# Patient Record
Sex: MALE | Race: WHITE | NOT HISPANIC OR LATINO | Employment: OTHER | RURAL
[De-identification: names, ages, dates, MRNs, and addresses within clinical notes are randomized per-mention and may not be internally consistent; named-entity substitution may affect disease eponyms.]

---

## 2022-05-03 ENCOUNTER — TELEMEDICINE (OUTPATIENT)
Dept: FAMILY MEDICINE CLINIC | Facility: CLINIC | Age: 67
End: 2022-05-03

## 2022-05-03 DIAGNOSIS — R60.9 PERIPHERAL EDEMA: ICD-10-CM

## 2022-05-03 DIAGNOSIS — I10 PRIMARY HYPERTENSION: ICD-10-CM

## 2022-05-03 DIAGNOSIS — Z12.11 SCREENING FOR COLON CANCER: ICD-10-CM

## 2022-05-03 DIAGNOSIS — J30.1 ALLERGIC RHINITIS DUE TO POLLEN, UNSPECIFIED SEASONALITY: Primary | ICD-10-CM

## 2022-05-03 DIAGNOSIS — F41.9 ANXIETY: ICD-10-CM

## 2022-05-03 PROBLEM — R06.09 DYSPNEA ON EXERTION: Status: ACTIVE | Noted: 2022-05-03

## 2022-05-03 PROBLEM — J44.9 CHRONIC OBSTRUCTIVE LUNG DISEASE: Status: ACTIVE | Noted: 2022-05-03

## 2022-05-03 PROBLEM — E66.01 MORBID OBESITY: Status: ACTIVE | Noted: 2022-05-03

## 2022-05-03 PROBLEM — B35.3 TINEA PEDIS: Status: ACTIVE | Noted: 2022-05-03

## 2022-05-03 PROBLEM — R25.2 LEG CRAMPS: Status: ACTIVE | Noted: 2022-05-03

## 2022-05-03 PROBLEM — R60.0 PERIPHERAL EDEMA: Status: ACTIVE | Noted: 2022-05-03

## 2022-05-03 PROBLEM — M25.50 JOINT PAIN: Status: ACTIVE | Noted: 2022-05-03

## 2022-05-03 PROCEDURE — 99214 OFFICE O/P EST MOD 30 MIN: CPT | Performed by: NURSE PRACTITIONER

## 2022-05-03 RX ORDER — MONTELUKAST SODIUM 10 MG/1
10 TABLET ORAL NIGHTLY
COMMUNITY
End: 2022-05-03 | Stop reason: SDUPTHER

## 2022-05-03 RX ORDER — FLUOXETINE HYDROCHLORIDE 20 MG/1
20 CAPSULE ORAL DAILY
Qty: 90 CAPSULE | Refills: 1 | Status: SHIPPED | OUTPATIENT
Start: 2022-05-03 | End: 2022-10-13

## 2022-05-03 RX ORDER — BUMETANIDE 2 MG/1
2 TABLET ORAL DAILY
Qty: 90 TABLET | Refills: 1 | Status: SHIPPED | OUTPATIENT
Start: 2022-05-03 | End: 2022-11-30 | Stop reason: SDUPTHER

## 2022-05-03 RX ORDER — MONTELUKAST SODIUM 10 MG/1
10 TABLET ORAL NIGHTLY
Qty: 90 TABLET | Refills: 3 | Status: SHIPPED | OUTPATIENT
Start: 2022-05-03

## 2022-05-03 RX ORDER — BUMETANIDE 2 MG/1
2 TABLET ORAL DAILY
COMMUNITY
End: 2022-05-03 | Stop reason: SDUPTHER

## 2022-05-03 RX ORDER — POTASSIUM CHLORIDE 1500 MG/1
20 TABLET, FILM COATED, EXTENDED RELEASE ORAL DAILY
Qty: 90 TABLET | Refills: 1 | Status: SHIPPED | OUTPATIENT
Start: 2022-05-03 | End: 2022-11-30 | Stop reason: SDUPTHER

## 2022-05-03 RX ORDER — FLUOXETINE HYDROCHLORIDE 20 MG/1
20 CAPSULE ORAL DAILY
COMMUNITY
End: 2022-05-03 | Stop reason: SDUPTHER

## 2022-05-03 NOTE — PROGRESS NOTES
Chief Complaint  Med Refill  This was an audio and video enabled telemedicine encounter.  Subjective          Fredy Palacios presents to [facility]  Pt is here for refills on his medications. He ran out of meds several months ago. He has had swelling at times and shortness of breath. His anxiety is not controlled.      Objective   Vital Signs:   There were no vitals taken for this visit.    There is no height or weight on file to calculate BMI.    Review of Systems   Constitutional: Negative for fatigue and fever.   Respiratory: Negative for shortness of breath.    Cardiovascular: Negative for chest pain, palpitations and leg swelling.   Neurological: Negative for syncope.   Psychiatric/Behavioral: The patient is not nervous/anxious.         Negative depression          Current Outpatient Medications:   •  bumetanide (BUMEX) 2 MG tablet, Take 1 tablet by mouth Daily., Disp: 90 tablet, Rfl: 1  •  FLUoxetine (PROzac) 20 MG capsule, Take 1 capsule by mouth Daily., Disp: 90 capsule, Rfl: 1  •  montelukast (SINGULAIR) 10 MG tablet, Take 1 tablet by mouth Every Night., Disp: 90 tablet, Rfl: 3  •  potassium chloride ER (K-TAB) 20 MEQ tablet controlled-release ER tablet, Take 1 tablet by mouth Daily., Disp: 90 tablet, Rfl: 1      Allergies: Cephalexin and Penicillins    Physical Exam  Constitutional:       Appearance: Normal appearance.   Neurological:      Mental Status: He is alert.   Psychiatric:         Mood and Affect: Mood normal.         Behavior: Behavior normal.         Thought Content: Thought content normal.         Judgment: Judgment normal.          Result Review :                   Assessment and Plan    Diagnoses and all orders for this visit:    1. Allergic rhinitis due to pollen, unspecified seasonality (Primary)  Comments:  Restart meds. Return for fasting labs in 2 weeks. Cologuard ordered.  Orders:  -     montelukast (SINGULAIR) 10 MG tablet; Take 1 tablet by mouth  Every Night.  Dispense: 90 tablet; Refill: 3    2. Anxiety  Comments:  Restart meds. Return in 2 weeks for follow up and fasting labs.   Orders:  -     FLUoxetine (PROzac) 20 MG capsule; Take 1 capsule by mouth Daily.  Dispense: 90 capsule; Refill: 1    3. Primary hypertension  Comments:  Restart meds. Return in 2 weeks for follow up and fasting labs.  Orders:  -     bumetanide (BUMEX) 2 MG tablet; Take 1 tablet by mouth Daily.  Dispense: 90 tablet; Refill: 1    4. Peripheral edema  Comments:  Restart meds. Return for follow up and fasting labs in 2 weeks.  Orders:  -     bumetanide (BUMEX) 2 MG tablet; Take 1 tablet by mouth Daily.  Dispense: 90 tablet; Refill: 1  -     potassium chloride ER (K-TAB) 20 MEQ tablet controlled-release ER tablet; Take 1 tablet by mouth Daily.  Dispense: 90 tablet; Refill: 1    5. Screening for colon cancer  -     Cologuard - Stool, Per Rectum; Future              BMI has not been calculated during today's encounter.        Follow Up   Return in about 2 weeks (around 5/17/2022) for Medicare Wellness with Provider.  Patient was given instructions and counseling regarding his condition or for health maintenance advice. Please see specific information pulled into the AVS if appropriate.     RADHA Baig

## 2022-06-02 ENCOUNTER — TELEPHONE (OUTPATIENT)
Dept: FAMILY MEDICINE CLINIC | Facility: CLINIC | Age: 67
End: 2022-06-02

## 2022-06-02 DIAGNOSIS — R53.83 FATIGUE, UNSPECIFIED TYPE: ICD-10-CM

## 2022-06-02 DIAGNOSIS — R73.9 HYPERGLYCEMIA: ICD-10-CM

## 2022-06-02 DIAGNOSIS — Z12.5 SCREENING FOR PROSTATE CANCER: ICD-10-CM

## 2022-06-02 DIAGNOSIS — E78.2 MIXED HYPERLIPIDEMIA: Primary | ICD-10-CM

## 2022-06-02 DIAGNOSIS — E55.9 VITAMIN D DEFICIENCY: ICD-10-CM

## 2022-06-02 DIAGNOSIS — E56.9 VITAMIN DEFICIENCY: ICD-10-CM

## 2022-06-02 NOTE — TELEPHONE ENCOUNTER
Patient called the [facility] office to have labs done prior to appointment on 06/13/2022 with Lakeisha. There are no active lab orders in his chart. If this is needed, please order labs for patient and give him a call letting him know he is good to schedule labs with [facility]. Please advise.

## 2022-06-06 ENCOUNTER — LAB (OUTPATIENT)
Dept: FAMILY MEDICINE CLINIC | Facility: CLINIC | Age: 67
End: 2022-06-06

## 2022-06-06 DIAGNOSIS — R53.83 FATIGUE, UNSPECIFIED TYPE: ICD-10-CM

## 2022-06-06 DIAGNOSIS — R73.9 HYPERGLYCEMIA: ICD-10-CM

## 2022-06-06 DIAGNOSIS — E78.2 MIXED HYPERLIPIDEMIA: ICD-10-CM

## 2022-06-06 DIAGNOSIS — Z12.5 SCREENING FOR PROSTATE CANCER: ICD-10-CM

## 2022-06-06 DIAGNOSIS — E55.9 VITAMIN D DEFICIENCY: ICD-10-CM

## 2022-06-06 DIAGNOSIS — E56.9 VITAMIN DEFICIENCY: ICD-10-CM

## 2022-06-06 PROCEDURE — 36415 COLL VENOUS BLD VENIPUNCTURE: CPT | Performed by: NURSE PRACTITIONER

## 2022-06-07 ENCOUNTER — OFFICE VISIT (OUTPATIENT)
Dept: FAMILY MEDICINE CLINIC | Facility: CLINIC | Age: 67
End: 2022-06-07

## 2022-06-07 VITALS
WEIGHT: 315 LBS | HEART RATE: 80 BPM | OXYGEN SATURATION: 98 % | DIASTOLIC BLOOD PRESSURE: 82 MMHG | SYSTOLIC BLOOD PRESSURE: 126 MMHG | BODY MASS INDEX: 44.1 KG/M2 | HEIGHT: 71 IN

## 2022-06-07 DIAGNOSIS — R42 DIZZINESS: ICD-10-CM

## 2022-06-07 DIAGNOSIS — S16.1XXA STRAIN OF STERNOCLEIDOMASTOID MUSCLE, INITIAL ENCOUNTER: Primary | ICD-10-CM

## 2022-06-07 DIAGNOSIS — J30.1 ALLERGIC RHINITIS DUE TO POLLEN, UNSPECIFIED SEASONALITY: ICD-10-CM

## 2022-06-07 LAB
25(OH)D3+25(OH)D2 SERPL-MCNC: 28.4 NG/ML (ref 30–100)
ALBUMIN SERPL-MCNC: 3.8 G/DL (ref 3.8–4.8)
ALBUMIN/GLOB SERPL: 1.6 {RATIO} (ref 1.2–2.2)
ALP SERPL-CCNC: 91 IU/L (ref 44–121)
ALT SERPL-CCNC: 20 IU/L (ref 0–44)
AST SERPL-CCNC: 22 IU/L (ref 0–40)
BASOPHILS # BLD AUTO: 0 X10E3/UL (ref 0–0.2)
BASOPHILS NFR BLD AUTO: 0 %
BILIRUB SERPL-MCNC: 0.5 MG/DL (ref 0–1.2)
BUN SERPL-MCNC: 14 MG/DL (ref 8–27)
BUN/CREAT SERPL: 11 (ref 10–24)
CALCIUM SERPL-MCNC: 8.7 MG/DL (ref 8.6–10.2)
CHLORIDE SERPL-SCNC: 99 MMOL/L (ref 96–106)
CHOLEST SERPL-MCNC: 184 MG/DL (ref 100–199)
CO2 SERPL-SCNC: 26 MMOL/L (ref 20–29)
CREAT SERPL-MCNC: 1.28 MG/DL (ref 0.76–1.27)
EGFRCR SERPLBLD CKD-EPI 2021: 61 ML/MIN/1.73
EOSINOPHIL # BLD AUTO: 0.3 X10E3/UL (ref 0–0.4)
EOSINOPHIL NFR BLD AUTO: 3 %
ERYTHROCYTE [DISTWIDTH] IN BLOOD BY AUTOMATED COUNT: 13.3 % (ref 11.6–15.4)
FOLATE SERPL-MCNC: 8.3 NG/ML
GLOBULIN SER CALC-MCNC: 2.4 G/DL (ref 1.5–4.5)
GLUCOSE SERPL-MCNC: 92 MG/DL (ref 65–99)
HBA1C MFR BLD: 5.8 % (ref 4.8–5.6)
HCT VFR BLD AUTO: 46.5 % (ref 37.5–51)
HDLC SERPL-MCNC: 59 MG/DL
HGB BLD-MCNC: 16.1 G/DL (ref 13–17.7)
IMM GRANULOCYTES # BLD AUTO: 0.1 X10E3/UL (ref 0–0.1)
IMM GRANULOCYTES NFR BLD AUTO: 1 %
LDLC SERPL CALC-MCNC: 106 MG/DL (ref 0–99)
LYMPHOCYTES # BLD AUTO: 3.8 X10E3/UL (ref 0.7–3.1)
LYMPHOCYTES NFR BLD AUTO: 42 %
MCH RBC QN AUTO: 31.6 PG (ref 26.6–33)
MCHC RBC AUTO-ENTMCNC: 34.6 G/DL (ref 31.5–35.7)
MCV RBC AUTO: 91 FL (ref 79–97)
MONOCYTES # BLD AUTO: 0.6 X10E3/UL (ref 0.1–0.9)
MONOCYTES NFR BLD AUTO: 7 %
NEUTROPHILS # BLD AUTO: 4.3 X10E3/UL (ref 1.4–7)
NEUTROPHILS NFR BLD AUTO: 47 %
PLATELET # BLD AUTO: 183 X10E3/UL (ref 150–450)
POTASSIUM SERPL-SCNC: 3.2 MMOL/L (ref 3.5–5.2)
PROT SERPL-MCNC: 6.2 G/DL (ref 6–8.5)
PSA SERPL-MCNC: 5.4 NG/ML (ref 0–4)
RBC # BLD AUTO: 5.09 X10E6/UL (ref 4.14–5.8)
SODIUM SERPL-SCNC: 144 MMOL/L (ref 134–144)
TRIGL SERPL-MCNC: 107 MG/DL (ref 0–149)
TSH SERPL DL<=0.005 MIU/L-ACNC: 3.7 UIU/ML (ref 0.45–4.5)
VIT B12 SERPL-MCNC: 183 PG/ML (ref 232–1245)
VLDLC SERPL CALC-MCNC: 19 MG/DL (ref 5–40)
WBC # BLD AUTO: 9 X10E3/UL (ref 3.4–10.8)

## 2022-06-07 PROCEDURE — 99213 OFFICE O/P EST LOW 20 MIN: CPT | Performed by: PHYSICIAN ASSISTANT

## 2022-06-07 RX ORDER — IPRATROPIUM BROMIDE 21 UG/1
2 SPRAY, METERED NASAL EVERY 12 HOURS
Qty: 1 EACH | Refills: 1 | Status: SHIPPED | OUTPATIENT
Start: 2022-06-07 | End: 2022-06-13

## 2022-06-07 NOTE — PROGRESS NOTES
"Chief Complaint  Right ear pain    Subjective        Fredy Palacios presents to [facility]  History of Present Illness   Patient states that about a week ago he began having a stiff neck especially on the right.  He states that he has been taking 3 tablets of ibuprofen once or twice a day with a bit of help.  He states that last night he put some heat on it which seem to make the neck pain twice his back.  He states that he has had pain running from his right shoulder up the right side of his neck to behind his right ear.  He denies any pain in his shoulder or arm he denies any change in sensation.  He states that he does not have any pain in his ear.   He states that he has had a bit of dizziness on and off for the past week or so.  He states that it reminds him of when he was diagnosed with vertigo and inner ear issues.  He states that his symptoms currently are much less severe. He has had no nausea or vomiting  He states that he has been keeping a runny nose he states that he has clear drainage most of the time.  He states that he was only recently diagnosed with allergies.  Objective   Vital Signs:  /82 (BP Location: Left arm, Patient Position: Sitting, Cuff Size: Large Adult)   Pulse 80   Ht 180.3 cm (71\")   Wt (!) 150 kg (330 lb)   SpO2 98%   BMI 46.03 kg/m²   Estimated body mass index is 46.03 kg/m² as calculated from the following:    Height as of this encounter: 180.3 cm (71\").    Weight as of this encounter: 150 kg (330 lb).           Physical Exam  Vitals reviewed.   Constitutional:       Appearance: Normal appearance.   HENT:      Head: Normocephalic.      Right Ear: Tympanic membrane, ear canal and external ear normal. There is impacted cerumen.      Left Ear: Tympanic membrane, ear canal and external ear normal.      Ears:      Comments: Impacted cerumen removed and TM only partially obscured     Nose: Nose normal.      Mouth/Throat:      Mouth: Mucous " membranes are moist.      Comments: Clear post nasal drainage  Cardiovascular:      Rate and Rhythm: Normal rate and regular rhythm.      Heart sounds: Normal heart sounds.   Pulmonary:      Effort: Pulmonary effort is normal.      Breath sounds: Normal breath sounds.   Musculoskeletal:        Arms:    Neurological:      General: No focal deficit present.      Mental Status: He is alert.   Psychiatric:         Mood and Affect: Mood normal.        Result Review :                Assessment and Plan   Diagnoses and all orders for this visit:    1. Strain of sternocleidomastoid muscle, initial encounter (Primary)  We will have patient continue his ibuprofen and encouraged ice to the area as well as gentle stretches.  2. Dizziness  We will add Atrovent nasal spray to help decrease runny nose and postnasal drainage which should also decrease any fluid behind his ears and his dizziness  -     ipratropium (ATROVENT) 0.03 % nasal spray; 2 sprays into the nostril(s) as directed by provider Every 12 (Twelve) Hours.  Dispense: 1 each; Refill: 1    3. Allergic rhinitis due to pollen, unspecified seasonality  See treatment as above.    -     ipratropium (ATROVENT) 0.03 % nasal spray; 2 sprays into the nostril(s) as directed by provider Every 12 (Twelve) Hours.  Dispense: 1 each; Refill: 1    He is to follow-up with his primary care in a few days.       Follow Up   Return today (on 6/7/2022), or folow with crystal on monday.  Patient was given instructions and counseling regarding his condition or for health maintenance advice. Please see specific information pulled into the AVS if appropriate.       Answers for HPI/ROS submitted by the patient on 6/7/2022  What is the primary reason for your visit?: Ear Pain       Yes

## 2022-06-08 ENCOUNTER — TELEPHONE (OUTPATIENT)
Dept: FAMILY MEDICINE CLINIC | Facility: CLINIC | Age: 67
End: 2022-06-08

## 2022-06-08 NOTE — TELEPHONE ENCOUNTER
PATIENT CALLED, HE WAS SEEN ON 6/7/22 FOR NECK PAIN AND IT HAS NOT GOTTEN ANY BETTER. HE HAS AN APPOINTMENT ON 6/13/22.

## 2022-06-08 NOTE — PROGRESS NOTES
Will you let him know that his PSA is abnormal so I'd like him to see a urologist. His vitamin D was low so take 2,000 units of vitamin D daily. His B12 was low so he can either come to office for monthly injections or try taking 4,000 units of B12 daily. His lymphocytes were a bit elevated which may mean his body was fighting off a virus.  Thanks!

## 2022-06-09 ENCOUNTER — PATIENT MESSAGE (OUTPATIENT)
Dept: INTERNAL MEDICINE | Facility: CLINIC | Age: 67
End: 2022-06-09

## 2022-06-09 DIAGNOSIS — R97.20 PSA ELEVATION: Primary | ICD-10-CM

## 2022-06-10 NOTE — TELEPHONE ENCOUNTER
Looks like a float sent it to crystal, although she was not the provider that saw this patient. Lakeisha is on vacation, I forwarded it on to eric rudolph.

## 2022-06-10 NOTE — TELEPHONE ENCOUNTER
Im the cover practitioner and not in the office today--please send to a practitioner who is in office todaythanks

## 2022-06-13 ENCOUNTER — OFFICE VISIT (OUTPATIENT)
Dept: FAMILY MEDICINE CLINIC | Facility: CLINIC | Age: 67
End: 2022-06-13

## 2022-06-13 VITALS
HEART RATE: 76 BPM | OXYGEN SATURATION: 96 % | BODY MASS INDEX: 44.1 KG/M2 | SYSTOLIC BLOOD PRESSURE: 130 MMHG | HEIGHT: 71 IN | DIASTOLIC BLOOD PRESSURE: 90 MMHG | WEIGHT: 315 LBS

## 2022-06-13 DIAGNOSIS — I10 BENIGN HYPERTENSION: ICD-10-CM

## 2022-06-13 DIAGNOSIS — E53.8 B12 DEFICIENCY: ICD-10-CM

## 2022-06-13 DIAGNOSIS — L03.116 CELLULITIS OF LEFT LOWER EXTREMITY: ICD-10-CM

## 2022-06-13 DIAGNOSIS — Z00.00 ENCOUNTER FOR ANNUAL WELLNESS EXAM IN MEDICARE PATIENT: Primary | ICD-10-CM

## 2022-06-13 DIAGNOSIS — R60.9 PERIPHERAL EDEMA: ICD-10-CM

## 2022-06-13 DIAGNOSIS — J30.1 ALLERGIC RHINITIS DUE TO POLLEN, UNSPECIFIED SEASONALITY: ICD-10-CM

## 2022-06-13 DIAGNOSIS — Z00.00 ROUTINE MEDICAL EXAM: ICD-10-CM

## 2022-06-13 DIAGNOSIS — M54.2 NECK PAIN: ICD-10-CM

## 2022-06-13 DIAGNOSIS — F41.9 ANXIETY: ICD-10-CM

## 2022-06-13 PROBLEM — F33.0 MILD RECURRENT MAJOR DEPRESSION: Status: ACTIVE | Noted: 2022-06-13

## 2022-06-13 PROBLEM — Z72.0 TOBACCO USER: Status: ACTIVE | Noted: 2022-06-13

## 2022-06-13 PROBLEM — F39 AFFECTIVE PSYCHOSIS: Status: ACTIVE | Noted: 2022-06-13

## 2022-06-13 PROCEDURE — 99397 PER PM REEVAL EST PAT 65+ YR: CPT | Performed by: NURSE PRACTITIONER

## 2022-06-13 PROCEDURE — 96372 THER/PROPH/DIAG INJ SC/IM: CPT | Performed by: NURSE PRACTITIONER

## 2022-06-13 PROCEDURE — 96160 PT-FOCUSED HLTH RISK ASSMT: CPT | Performed by: NURSE PRACTITIONER

## 2022-06-13 PROCEDURE — 1159F MED LIST DOCD IN RCRD: CPT | Performed by: NURSE PRACTITIONER

## 2022-06-13 PROCEDURE — G0439 PPPS, SUBSEQ VISIT: HCPCS | Performed by: NURSE PRACTITIONER

## 2022-06-13 PROCEDURE — 1170F FXNL STATUS ASSESSED: CPT | Performed by: NURSE PRACTITIONER

## 2022-06-13 RX ORDER — TIZANIDINE 4 MG/1
4 TABLET ORAL EVERY 8 HOURS PRN
Qty: 30 TABLET | Refills: 0 | Status: SHIPPED | OUTPATIENT
Start: 2022-06-13 | End: 2022-06-23

## 2022-06-13 RX ORDER — SULFAMETHOXAZOLE AND TRIMETHOPRIM 800; 160 MG/1; MG/1
1 TABLET ORAL 2 TIMES DAILY
Qty: 20 TABLET | Refills: 0 | Status: SHIPPED | OUTPATIENT
Start: 2022-06-13 | End: 2022-06-18

## 2022-06-13 RX ORDER — CYANOCOBALAMIN 1000 UG/ML
1000 INJECTION, SOLUTION INTRAMUSCULAR; SUBCUTANEOUS
Status: DISCONTINUED | OUTPATIENT
Start: 2022-06-13 | End: 2023-02-01

## 2022-06-13 RX ORDER — HYDROCODONE BITARTRATE AND ACETAMINOPHEN 7.5; 325 MG/1; MG/1
1 TABLET ORAL EVERY 6 HOURS PRN
Qty: 12 TABLET | Refills: 0 | Status: SHIPPED | OUTPATIENT
Start: 2022-06-13 | End: 2022-06-18

## 2022-06-13 RX ORDER — TRIAMCINOLONE ACETONIDE 40 MG/ML
60 INJECTION, SUSPENSION INTRA-ARTICULAR; INTRAMUSCULAR ONCE
Status: COMPLETED | OUTPATIENT
Start: 2022-06-13 | End: 2022-06-13

## 2022-06-13 RX ORDER — POTASSIUM CHLORIDE 20 MEQ/1
TABLET, EXTENDED RELEASE ORAL
COMMUNITY
Start: 2022-05-03 | End: 2022-06-13 | Stop reason: SDUPTHER

## 2022-06-13 RX ADMIN — CYANOCOBALAMIN 1000 MCG: 1000 INJECTION, SOLUTION INTRAMUSCULAR; SUBCUTANEOUS at 15:47

## 2022-06-13 RX ADMIN — TRIAMCINOLONE ACETONIDE 60 MG: 40 INJECTION, SUSPENSION INTRA-ARTICULAR; INTRAMUSCULAR at 15:48

## 2022-06-13 NOTE — PROGRESS NOTES
The ABCs of the Annual Wellness Visit  Subsequent Medicare Wellness Visit    Chief Complaint   Patient presents with   • Annual Exam      Subjective    History of Present Illness:  Fredy Palacios is a 66 y.o. male who presents for a Subsequent Medicare Wellness Visit.  He has had right sided neck pain x 10 days with no known injury. He went to the ER last night and was given Robaxin. He has had increased swelling in his legs since yesterday with redness.   The following portions of the patient's history were reviewed and   updated as appropriate: allergies, past family history, past social history and past surgical history.    Compared to one year ago, the patient feels his physical   health is the same.    Compared to one year ago, the patient feels his mental   health is worse.    Recent Hospitalizations:  He was not admitted to the hospital during the last year.       Current Medical Providers:  Patient Care Team:  Lakeisha Lynne APRN as PCP - General (Nurse Practitioner)    Outpatient Medications Prior to Visit   Medication Sig Dispense Refill   • bumetanide (BUMEX) 2 MG tablet Take 1 tablet by mouth Daily. 90 tablet 1   • FLUoxetine (PROzac) 20 MG capsule Take 1 capsule by mouth Daily. 90 capsule 1   • montelukast (SINGULAIR) 10 MG tablet Take 1 tablet by mouth Every Night. 90 tablet 3   • potassium chloride ER (K-TAB) 20 MEQ tablet controlled-release ER tablet Take 1 tablet by mouth Daily. 90 tablet 1   • ipratropium (ATROVENT) 0.03 % nasal spray 2 sprays into the nostril(s) as directed by provider Every 12 (Twelve) Hours. 1 each 1   • potassium chloride (K-DUR,KLOR-CON) 20 MEQ CR tablet        No facility-administered medications prior to visit.       Opioid medication/s are on active medication list.  and I have evaluated his active treatment plan and pain score trends (see table).  There were no vitals filed for this visit.  I have reviewed the chart for potential of high risk medication and  "harmful drug interactions in the elderly.            Aspirin is not on active medication list.  Aspirin use is not indicated based on review of current medical condition/s. Risk of harm outweighs potential benefits.  .    Patient Active Problem List   Diagnosis   • Anxiety   • Benign hypertension   • Chronic obstructive lung disease (HCC)   • Dyspnea on exertion   • Joint pain   • Leg cramps   • Morbid obesity (HCC)   • Peripheral edema   • Tinea pedis   • Affective psychosis (HCC)   • Body mass index (BMI) 40.0-44.9, adult (HCC)   • Mild recurrent major depression (HCC)   • Tobacco user     Advance Care Planning  Advance Directive is not on file.  ACP discussion was held with the patient during this visit. Patient does not have an advance directive, information provided.    Review of Systems   Constitutional: Negative for fatigue and fever.   Respiratory: Negative for shortness of breath.    Cardiovascular: Positive for leg swelling. Negative for chest pain and palpitations.   Musculoskeletal: Positive for neck pain and neck stiffness.   Neurological: Negative for syncope.   Psychiatric/Behavioral: The patient is not nervous/anxious.         Objective    Vitals:    06/13/22 1443   BP: 130/90   Pulse: 76   SpO2: 96%   Weight: (!) 155 kg (342 lb)   Height: 180.3 cm (71\")     Estimated body mass index is 47.7 kg/m² as calculated from the following:    Height as of this encounter: 180.3 cm (71\").    Weight as of this encounter: 155 kg (342 lb).    Class 3 Severe Obesity (BMI >=40). Obesity-related health conditions include the following: hypertension. Obesity is unchanged. BMI is is above average; BMI management plan is completed. We discussed low calorie, low carb based diet program and portion control.      Does the patient have evidence of cognitive impairment? No    Physical Exam  Constitutional:       Appearance: Normal appearance.   HENT:      Head: Normocephalic.   Eyes:      Conjunctiva/sclera: Conjunctivae " normal.      Pupils: Pupils are equal, round, and reactive to light.   Cardiovascular:      Rate and Rhythm: Normal rate and regular rhythm.      Heart sounds: Normal heart sounds.      Comments: Left lower leg erythematous  Pulmonary:      Effort: Pulmonary effort is normal.      Breath sounds: Normal breath sounds.   Abdominal:      Tenderness: There is no abdominal tenderness.   Musculoskeletal:         General: Normal range of motion.      Right lower le+ Edema present.      Left lower le+ Edema present.   Skin:     General: Skin is warm and dry.      Capillary Refill: Capillary refill takes less than 2 seconds.   Neurological:      General: No focal deficit present.      Mental Status: He is alert and oriented to person, place, and time.   Psychiatric:         Mood and Affect: Mood normal.         Behavior: Behavior normal.         Thought Content: Thought content normal.         Judgment: Judgment normal.       Lab Results   Component Value Date    CHLPL 184 2022    TRIG 107 2022    HDL 59 2022     (H) 2022    VLDL 19 2022    HGBA1C 5.8 (H) 2022            HEALTH RISK ASSESSMENT    Smoking Status:  Social History     Tobacco Use   Smoking Status Light Tobacco Smoker   • Types: Cigars   Smokeless Tobacco Current User   • Types: Chew     Alcohol Consumption:  Social History     Substance and Sexual Activity   Alcohol Use Not Currently     Fall Risk Screen:    STEADI Fall Risk Assessment was completed, and patient is at LOW risk for falls.Assessment completed on:5/3/2022    Depression Screening:  PHQ-2/PHQ-9 Depression Screening 2022   Little Interest or Pleasure in Doing Things 0-->not at all   Feeling Down, Depressed or Hopeless 0-->not at all   PHQ-9: Brief Depression Severity Measure Score 0       Health Habits and Functional and Cognitive Screening:  Functional & Cognitive Status 2022   Do you have difficulty preparing food and eating? No   Do you  have difficulty bathing yourself, getting dressed or grooming yourself? No   Do you have difficulty using the toilet? No   Do you have difficulty moving around from place to place? No   Do you have trouble with steps or getting out of a bed or a chair? No   Current Diet Unhealthy Diet   Dental Exam Not up to date   Eye Exam Not up to date   Exercise (times per week) 0 times per week   Current Exercises Include No Regular Exercise   Do you need help using the phone?  No   Are you deaf or do you have serious difficulty hearing?  No   Do you need help with transportation? No   Do you need help shopping? No   Do you need help preparing meals?  No   Do you need help with housework?  No   Do you need help with laundry? No   Do you need help taking your medications? No   Do you need help managing money? No   Do you ever drive or ride in a car without wearing a seat belt? No   Have you felt unusual stress, anger or loneliness in the last month? Yes   Who do you live with? Spouse   If you need help, do you have trouble finding someone available to you? No   Have you been bothered in the last four weeks by sexual problems? No   Do you have difficulty concentrating, remembering or making decisions? No       Age-appropriate Screening Schedule:  Refer to the list below for future screening recommendations based on patient's age, sex and/or medical conditions. Orders for these recommended tests are listed in the plan section. The patient has been provided with a written plan.    Health Maintenance   Topic Date Due   • ZOSTER VACCINE (2 of 3) 06/16/2023 (Originally 11/14/2017)   • INFLUENZA VACCINE  08/01/2022   • TDAP/TD VACCINES (2 - Td or Tdap) 11/30/2022   • LIPID PANEL  06/06/2023              Assessment & Plan   CMS Preventative Services Quick Reference  Risk Factors Identified During Encounter  Inactivity/Sedentary  Obesity/Overweight   The above risks/problems have been discussed with the patient.  Follow up actions/plans  if indicated are seen below in the Assessment/Plan Section.  Pertinent information has been shared with the patient in the After Visit Summary.    Diagnoses and all orders for this visit:    1. Encounter for annual wellness exam in Medicare patient (Primary)  Comments:  Patient is doing well with no functional or cognitive decline.     2. Neck pain  Comments:  X-rays ordered and we will order an MRI if not improving.  Kenalog given.  Norco and tizanidine as needed for severe pain.  Call if not improving in 1 week  Orders:  -     XR Spine Cervical 2 or 3 View; Future  -     tiZANidine (ZANAFLEX) 4 MG tablet; Take 1 tablet by mouth Every 8 (Eight) Hours As Needed for Muscle Spasms.  Dispense: 30 tablet; Refill: 0  -     triamcinolone acetonide (KENALOG-40) injection 60 mg  -     HYDROcodone-acetaminophen (Norco) 7.5-325 MG per tablet; Take 1 tablet by mouth Every 6 (Six) Hours As Needed for Moderate Pain .  Dispense: 12 tablet; Refill: 0    3. Peripheral edema  Comments:  Continue Bumex.  Low-sodium diet.    4. Benign hypertension  Comments:  We discussed diet and exercise.  Continue Bumex.    5. Allergic rhinitis due to pollen, unspecified seasonality  Comments:  Continue Singulair.    6. Anxiety  Comments:  Continue fluoxetine.    7. Cellulitis of left lower extremity  Comments:  Finish Bactrim.  Return for worsening symptoms and if not improving in 1 week.  Orders:  -     sulfamethoxazole-trimethoprim (Bactrim DS) 800-160 MG per tablet; Take 1 tablet by mouth 2 (Two) Times a Day.  Dispense: 20 tablet; Refill: 0    8. Routine medical exam  Comments:  We discussed recent labs. Guadalupe BECERRA. Pt will get pneumococcal booster at pharmacy.        Follow Up:   No follow-ups on file.     An After Visit Summary and PPPS were made available to the patient.

## 2022-06-15 ENCOUNTER — TELEPHONE (OUTPATIENT)
Dept: FAMILY MEDICINE CLINIC | Facility: CLINIC | Age: 67
End: 2022-06-15

## 2022-06-15 NOTE — TELEPHONE ENCOUNTER
Patient's wife, Tonya, states that today they noticed that the patient has swelling in his private parts.  She said the only thing he has done different is that he started taking medication that was prescribed to him on 6/13/22.  She is concerned and would like a call back. Ph: [phone]

## 2022-06-17 ENCOUNTER — TELEPHONE (OUTPATIENT)
Dept: FAMILY MEDICINE CLINIC | Facility: CLINIC | Age: 67
End: 2022-06-17

## 2022-06-17 NOTE — TELEPHONE ENCOUNTER
Does she mean she stopped the Bactrim antibiotic? Or the Tizanidine muscle relaxer? Is his swelling worse in his legs? OK to add him on as a virtual visit so we can talk about it. Thanks!

## 2022-06-18 ENCOUNTER — TELEMEDICINE (OUTPATIENT)
Dept: FAMILY MEDICINE CLINIC | Facility: CLINIC | Age: 67
End: 2022-06-18

## 2022-06-18 DIAGNOSIS — M54.2 NECK PAIN: ICD-10-CM

## 2022-06-18 DIAGNOSIS — N48.1 BALANITIS: Primary | ICD-10-CM

## 2022-06-18 PROCEDURE — 99214 OFFICE O/P EST MOD 30 MIN: CPT | Performed by: NURSE PRACTITIONER

## 2022-06-18 RX ORDER — FLUCONAZOLE 150 MG/1
150 TABLET ORAL
Qty: 3 TABLET | Refills: 0 | Status: SHIPPED | OUTPATIENT
Start: 2022-06-18 | End: 2022-09-19 | Stop reason: SDUPTHER

## 2022-06-18 RX ORDER — HYDROCODONE BITARTRATE AND ACETAMINOPHEN 7.5; 325 MG/1; MG/1
1 TABLET ORAL EVERY 6 HOURS PRN
Qty: 30 TABLET | Refills: 0 | Status: SHIPPED | OUTPATIENT
Start: 2022-06-18 | End: 2022-06-24 | Stop reason: SDUPTHER

## 2022-06-18 RX ORDER — NYSTATIN AND TRIAMCINOLONE ACETONIDE 100000; 1 [USP'U]/G; MG/G
1 OINTMENT TOPICAL 2 TIMES DAILY
Qty: 30 G | Refills: 0 | Status: SHIPPED | OUTPATIENT
Start: 2022-06-18 | End: 2022-09-19 | Stop reason: SDUPTHER

## 2022-06-18 NOTE — PROGRESS NOTES
Chief Complaint  Neck Pain  This was an audio and video enabled telemedicine encounter.  Subjective          Fredy Palacios presents to [facility]  Patient began pain and swelling in his penis after taking Bactrim.  He stopped taking this and feels that his symptoms are beginning to improve.  He has also had ongoing neck pain which has not improved.      Objective   Vital Signs:   There were no vitals taken for this visit.    There is no height or weight on file to calculate BMI.    Review of Systems   Constitutional: Negative for fatigue and fever.   Respiratory: Negative for shortness of breath.    Cardiovascular: Negative for chest pain, palpitations and leg swelling.   Skin: Positive for rash and wound.   Neurological: Negative for syncope.   Psychiatric/Behavioral: The patient is not nervous/anxious.           Current Outpatient Medications:   •  bumetanide (BUMEX) 2 MG tablet, Take 1 tablet by mouth Daily., Disp: 90 tablet, Rfl: 1  •  FLUoxetine (PROzac) 20 MG capsule, Take 1 capsule by mouth Daily., Disp: 90 capsule, Rfl: 1  •  HYDROcodone-acetaminophen (Norco) 7.5-325 MG per tablet, Take 1 tablet by mouth Every 6 (Six) Hours As Needed for Moderate Pain ., Disp: 30 tablet, Rfl: 0  •  montelukast (SINGULAIR) 10 MG tablet, Take 1 tablet by mouth Every Night., Disp: 90 tablet, Rfl: 3  •  potassium chloride ER (K-TAB) 20 MEQ tablet controlled-release ER tablet, Take 1 tablet by mouth Daily., Disp: 90 tablet, Rfl: 1  •  tiZANidine (ZANAFLEX) 4 MG tablet, Take 1 tablet by mouth Every 8 (Eight) Hours As Needed for Muscle Spasms., Disp: 30 tablet, Rfl: 0  •  fluconazole (Diflucan) 150 MG tablet, Take 1 tablet by mouth Every 3 (Three) Days., Disp: 3 tablet, Rfl: 0  •  mupirocin (BACTROBAN) 2 % ointment, Apply 1 application topically to the appropriate area as directed 3 (Three) Times a Day., Disp: 30 g, Rfl: 0  •  nystatin-triamcinolone (MYCOLOG) 364165-8.1 UNIT/GM-% ointment, Apply  1 application topically to the appropriate area as directed 2 (Two) Times a Day., Disp: 30 g, Rfl: 0    Current Facility-Administered Medications:   •  cyanocobalamin injection 1,000 mcg, 1,000 mcg, Intramuscular, Q30 Days, Lakeisha Lynne, APRN, 1,000 mcg at 06/13/22 1547      Allergies: Azithromycin, Bactrim [sulfamethoxazole-trimethoprim], Cephalexin, Levofloxacin, and Penicillins    Physical Exam  Constitutional:       Appearance: Normal appearance.   Neurological:      Mental Status: He is alert.   Psychiatric:         Mood and Affect: Mood normal.         Thought Content: Thought content normal.         Judgment: Judgment normal.          Result Review :                   Assessment and Plan    Diagnoses and all orders for this visit:    1. Balanitis (Primary)  Comments:  Finish Diflucan and apply ointment. Keep area clean and dry. Return for worsened sx and if not improving in 1 week.  Orders:  -     fluconazole (Diflucan) 150 MG tablet; Take 1 tablet by mouth Every 3 (Three) Days.  Dispense: 3 tablet; Refill: 0  -     nystatin-triamcinolone (MYCOLOG) 057846-9.1 UNIT/GM-% ointment; Apply 1 application topically to the appropriate area as directed 2 (Two) Times a Day.  Dispense: 30 g; Refill: 0  -     mupirocin (BACTROBAN) 2 % ointment; Apply 1 application topically to the appropriate area as directed 3 (Three) Times a Day.  Dispense: 30 g; Refill: 0    2. Neck pain  Comments:  Norco as needed. We will order MRI. Return for worsened sx.   Orders:  -     HYDROcodone-acetaminophen (Norco) 7.5-325 MG per tablet; Take 1 tablet by mouth Every 6 (Six) Hours As Needed for Moderate Pain .  Dispense: 30 tablet; Refill: 0  -     MRI Cervical Spine Without Contrast; Future                 Follow Up   Return in about 1 week (around 6/25/2022) for if not improving or sooner if symptoms worsen.  Patient was given instructions and counseling regarding his condition or for health maintenance advice. Please see specific  information pulled into the AVS if appropriate.     RADHA Baig

## 2022-06-22 ENCOUNTER — TELEPHONE (OUTPATIENT)
Dept: FAMILY MEDICINE CLINIC | Facility: CLINIC | Age: 67
End: 2022-06-22

## 2022-06-23 DIAGNOSIS — M54.2 NECK PAIN: ICD-10-CM

## 2022-06-23 RX ORDER — TIZANIDINE 4 MG/1
TABLET ORAL
Qty: 30 TABLET | Refills: 0 | Status: SHIPPED | OUTPATIENT
Start: 2022-06-23 | End: 2022-06-30 | Stop reason: SDUPTHER

## 2022-06-24 ENCOUNTER — TELEPHONE (OUTPATIENT)
Dept: FAMILY MEDICINE CLINIC | Facility: CLINIC | Age: 67
End: 2022-06-24

## 2022-06-24 DIAGNOSIS — M54.2 NECK PAIN: ICD-10-CM

## 2022-06-24 RX ORDER — HYDROCODONE BITARTRATE AND ACETAMINOPHEN 7.5; 325 MG/1; MG/1
1 TABLET ORAL EVERY 8 HOURS PRN
Qty: 12 TABLET | Refills: 0 | Status: SHIPPED | OUTPATIENT
Start: 2022-06-24 | End: 2022-11-03

## 2022-06-30 ENCOUNTER — PATIENT MESSAGE (OUTPATIENT)
Dept: FAMILY MEDICINE CLINIC | Facility: CLINIC | Age: 67
End: 2022-06-30

## 2022-06-30 DIAGNOSIS — M54.2 NECK PAIN: ICD-10-CM

## 2022-06-30 RX ORDER — TIZANIDINE 4 MG/1
4 TABLET ORAL EVERY 8 HOURS PRN
Qty: 90 TABLET | Refills: 0 | Status: SHIPPED | OUTPATIENT
Start: 2022-06-30 | End: 2022-11-03

## 2022-06-30 RX ORDER — TIZANIDINE 4 MG/1
4 TABLET ORAL EVERY 8 HOURS PRN
Qty: 30 TABLET | Refills: 0 | Status: SHIPPED | OUTPATIENT
Start: 2022-06-30 | End: 2022-06-30 | Stop reason: SDUPTHER

## 2022-06-30 RX ORDER — TIZANIDINE 4 MG/1
TABLET ORAL
Qty: 30 TABLET | Refills: 0 | OUTPATIENT
Start: 2022-06-30

## 2022-06-30 NOTE — TELEPHONE ENCOUNTER
PT CALLED TO REPORT HE HAD REQUESTED REFILL ON TIZANIDINE BEFORE THE HOLIDAY WEEKEND AS HE HAS APPROX 10 TAB LEFT IN THIS CURRENT RX. HE WOULD LIKE THEM FILLED PRIOR TO THE HOLIDAY WEEKEND IF POSSIBLE. INSTRUCTIONS ARE PRN EVERY 8 HRS AND HE REPORTS HE HAS BEEN TAKING THEM AS INSTRUCTED AND THEY ARE VERY HELPFUL AND WOULD APPRECIATE A REFILL. PLEASE CALL TO ADVISE.     HE WOULD LIKE TO LET EVERYONE KNOW THAT HE GREATLY APPRECIATES ALL THE CARE HE HAS RECEIVED AT OUR OFFICE. HE STATES THIS IS THE BEST HEALTHCARE OFFICE HE HAS BEEN TO AND HE AND HIS WIFE ARE BOTH GRATEFUL FOR THE CARE THEY RECEIVE. HE SPECIFICALLY WANTED TO SAY THANK YOU TO FACUNDO FOR HELPING HIM FEEL SO MUCH BETTER.

## 2022-06-30 NOTE — TELEPHONE ENCOUNTER
That was very nice of him. Forgot about it being a holiday weekend. Looks like he will run out on Monday. Ill go ahead and send a refill.

## 2022-08-18 ENCOUNTER — TELEPHONE (OUTPATIENT)
Dept: FAMILY MEDICINE CLINIC | Facility: CLINIC | Age: 67
End: 2022-08-18

## 2022-08-18 NOTE — TELEPHONE ENCOUNTER
Caller: Fredy Palacios    Relationship to patient: Self    Best call back number: [phone]    Date of exposure: UNKNOWN    Date of positive COVID19 test: 08/18/2022    Date if possible COVID19 exposure: UNKNOWN    COVID19 symptoms: HEADACHE, CONGESTION, SORE THROAT, COUGH    Date of initial quarantine: 08/18/2022    Additional information or concerns: PATIENT HAS CALLED AND STATED HE TESTED POSITIVE FOR COVID AND IS REQUESTING IF SOMETHING CAN BE CALLED INTO PHARMACY TO TREAT, PATIENT IS ALSO REQUESTING A CALL BACK TO ADVISE HIM ON WHAT HIS NEXT STEPS SHOULD BE IN HIS PLAN OF CARE.    What is the patients preferred pharmacy: [facility] [address]

## 2022-08-19 NOTE — TELEPHONE ENCOUNTER
Pt contacted, to treat with OTC mdication. Move around once an hour and go to the ER if he experiences difficulty breathing, chest pain or SOB. Verbalized understanding

## 2022-08-24 ENCOUNTER — TELEPHONE (OUTPATIENT)
Dept: FAMILY MEDICINE CLINIC | Facility: CLINIC | Age: 67
End: 2022-08-24

## 2022-08-24 NOTE — TELEPHONE ENCOUNTER
Caller: Fredy Palacios     Relationship to patient: Self     Best call back number: [phone]     Date of exposure: UNKNOWN     Date of positive COVID19 test: 08/18/2022     Date if possible COVID19 exposure: UNKNOWN     COVID19 symptoms:SINUS HEADACHE, CHEST CONGESTION, COUGHING     Date of initial quarantine: 08/18/2022     Additional information or concerns: PATIENT HAS CALLED AND STATED HE TESTED POSITIVE FOR COVID BACK ON 0/18/22 AND NOW HE THINKS IT HAS SETTLED DOWN IN HIS CHEST AND FEELS A TIGHTNESS IN HIS CHEST AND WANTED TO SEE WHAT PROVIDER WOULD LIKE TO DO OR IF THERE WAS ANY MEDICATION TO HELP WITH CHEST CONGESTION THAT COULD BE SENT INTO PHARMACY     PLEASE ADVISE ASAP      What is the patients preferred pharmacy: [facility] [address]

## 2022-09-08 ENCOUNTER — TELEPHONE (OUTPATIENT)
Dept: FAMILY MEDICINE CLINIC | Facility: CLINIC | Age: 67
End: 2022-09-08

## 2022-09-08 NOTE — TELEPHONE ENCOUNTER
Patient COVID + 2 weeks ago, all symptoms gone except chest feeling a little heavy. States he took two doses of an old antibiotic and now having these blistering issues. Please advise.

## 2022-09-08 NOTE — TELEPHONE ENCOUNTER
Pt called in- + for Covid, taking meds. Did not specify which medication he was taking/did not remember the name. Said that after a few doses he noticed blisters on the inside of his mouth. Has had blisters pop up in mouth, on lips, eyelids, hands, scrotum, penis, and torso. He said the eyelids/mouth are clearing up but the others are scabbed over and not healing as well. Would like to be seen as soon as possible to discuss possible treatment and figuring out what he is reacting to. Please return call. Please call wife's #, his phone is not allowing calls to ring through. [phone]

## 2022-09-09 ENCOUNTER — TELEPHONE (OUTPATIENT)
Dept: FAMILY MEDICINE CLINIC | Facility: CLINIC | Age: 67
End: 2022-09-09

## 2022-09-09 NOTE — TELEPHONE ENCOUNTER
[facility] SITE STATES THAT KIT HAS BEEN SENT TO PT BUT THEY HAVE NOT RECEIVED BACK TO THE LAB. PT NEEDS TO BE CONTACTED TO SEE IF HE RECEIVED KIT, IF HE IS GOING TO DO TEST (IF NOT NEEDS TO GO ON AND SEND THAT KIT BACK AND CANCEL ORDER)

## 2022-09-09 NOTE — TELEPHONE ENCOUNTER
Pt received kit, has been sick and unable to do test. Will do test and send back as soon as he can. Postponing order

## 2022-09-16 DIAGNOSIS — N48.1 BALANITIS: ICD-10-CM

## 2022-09-17 ENCOUNTER — PATIENT MESSAGE (OUTPATIENT)
Dept: FAMILY MEDICINE CLINIC | Facility: CLINIC | Age: 67
End: 2022-09-17

## 2022-09-19 ENCOUNTER — TELEMEDICINE (OUTPATIENT)
Dept: FAMILY MEDICINE CLINIC | Facility: CLINIC | Age: 67
End: 2022-09-19

## 2022-09-19 ENCOUNTER — PATIENT MESSAGE (OUTPATIENT)
Dept: FAMILY MEDICINE CLINIC | Facility: CLINIC | Age: 67
End: 2022-09-19

## 2022-09-19 ENCOUNTER — TELEPHONE (OUTPATIENT)
Dept: FAMILY MEDICINE CLINIC | Facility: CLINIC | Age: 67
End: 2022-09-19

## 2022-09-19 DIAGNOSIS — N48.1 BALANITIS: ICD-10-CM

## 2022-09-19 DIAGNOSIS — M54.2 NECK PAIN: Primary | ICD-10-CM

## 2022-09-19 DIAGNOSIS — B37.0 THRUSH: Primary | ICD-10-CM

## 2022-09-19 PROCEDURE — 99213 OFFICE O/P EST LOW 20 MIN: CPT | Performed by: NURSE PRACTITIONER

## 2022-09-19 RX ORDER — FLUCONAZOLE 150 MG/1
150 TABLET ORAL
Qty: 3 TABLET | Refills: 0 | Status: SHIPPED | OUTPATIENT
Start: 2022-09-19 | End: 2022-10-12

## 2022-09-19 RX ORDER — FLUCONAZOLE 150 MG/1
TABLET ORAL
Qty: 3 TABLET | Refills: 0 | OUTPATIENT
Start: 2022-09-19

## 2022-09-19 RX ORDER — NYSTATIN AND TRIAMCINOLONE ACETONIDE 100000; 1 [USP'U]/G; MG/G
1 OINTMENT TOPICAL 2 TIMES DAILY
Qty: 30 G | Refills: 0 | Status: SHIPPED | OUTPATIENT
Start: 2022-09-19 | End: 2022-11-30 | Stop reason: SDUPTHER

## 2022-09-19 NOTE — PROGRESS NOTES
Chief Complaint  Infection  This was an audio and video enabled telemedicine encounter.  Patient was in home, provider was in office.  Subjective          Fredy Palacios presents to [facility]  History of Present Illness  Pt has had yeast in his groin and buttock area for the past few weeks since taking an antibiotic. He has had similar sx in the past that responded to oral meds.       Objective   Vital Signs:   There were no vitals taken for this visit.    There is no height or weight on file to calculate BMI.    Review of Systems   Constitutional: Negative for fatigue and fever.   Respiratory: Negative for shortness of breath.    Cardiovascular: Negative for chest pain, palpitations and leg swelling.   Neurological: Negative for syncope.   Psychiatric/Behavioral: The patient is not nervous/anxious.           Current Outpatient Medications:   •  fluconazole (Diflucan) 150 MG tablet, Take 1 tablet by mouth Every 3 (Three) Days., Disp: 3 tablet, Rfl: 0  •  nystatin-triamcinolone (MYCOLOG) 076448-5.1 UNIT/GM-% ointment, Apply 1 application topically to the appropriate area as directed 2 (Two) Times a Day., Disp: 30 g, Rfl: 0  •  bumetanide (BUMEX) 2 MG tablet, Take 1 tablet by mouth Daily., Disp: 90 tablet, Rfl: 1  •  FLUoxetine (PROzac) 20 MG capsule, Take 1 capsule by mouth Daily., Disp: 90 capsule, Rfl: 1  •  HYDROcodone-acetaminophen (Norco) 7.5-325 MG per tablet, Take 1 tablet by mouth Every 8 (Eight) Hours As Needed for Severe Pain ., Disp: 12 tablet, Rfl: 0  •  montelukast (SINGULAIR) 10 MG tablet, Take 1 tablet by mouth Every Night., Disp: 90 tablet, Rfl: 3  •  nystatin (MYCOSTATIN) 100,000 unit/mL suspension, Swish and swallow 5 mL 4 (Four) Times a Day., Disp: 200 mL, Rfl: 0  •  potassium chloride ER (K-TAB) 20 MEQ tablet controlled-release ER tablet, Take 1 tablet by mouth Daily., Disp: 90 tablet, Rfl: 1  •  tiZANidine (ZANAFLEX) 4 MG tablet, Take 1 tablet by mouth Every 8  (Eight) Hours As Needed for Muscle Spasms., Disp: 90 tablet, Rfl: 0    Current Facility-Administered Medications:   •  cyanocobalamin injection 1,000 mcg, 1,000 mcg, Intramuscular, Q30 Days, Lakeisha Lynne APRN, 1,000 mcg at 06/13/22 1547      Allergies: Azithromycin, Bactrim [sulfamethoxazole-trimethoprim], Cephalexin, Levofloxacin, and Penicillins    Physical Exam  Constitutional:       Appearance: Normal appearance.   Abdominal:      Tenderness: There is no abdominal tenderness.   Neurological:      Mental Status: He is alert.   Psychiatric:         Mood and Affect: Mood normal.         Behavior: Behavior normal.         Thought Content: Thought content normal.         Judgment: Judgment normal.          Result Review :                   Assessment and Plan    Diagnoses and all orders for this visit:    1. Thrush (Primary)  Comments:  Nystatin as directed.  Orders:  -     nystatin (MYCOSTATIN) 100,000 unit/mL suspension; Swish and swallow 5 mL 4 (Four) Times a Day.  Dispense: 200 mL; Refill: 0    2. Balanitis  Comments:  Finish Diflucan and apply ointment. Keep area clean and dry. Return for worsened sx and if not improving in 1 week.  Orders:  -     fluconazole (Diflucan) 150 MG tablet; Take 1 tablet by mouth Every 3 (Three) Days.  Dispense: 3 tablet; Refill: 0  -     nystatin-triamcinolone (MYCOLOG) 618488-5.1 UNIT/GM-% ointment; Apply 1 application topically to the appropriate area as directed 2 (Two) Times a Day.  Dispense: 30 g; Refill: 0                Follow Up   No follow-ups on file.  Patient was given instructions and counseling regarding his condition or for health maintenance advice. Please see specific information pulled into the AVS if appropriate.     RADHA Baig

## 2022-09-19 NOTE — TELEPHONE ENCOUNTER
Caller: Fredy Palacios    Relationship to patient: Self    Best call back number: [phone], [phone]    Chief complaint: YEAST INFECTION FROM ANTIBIOTICS    Type of visit: MYCHART    Requested date: AS SOON AS POSSIBLE    If rescheduling, when is the original appointment:     Additional notes:THE PATIENT WANTS TO SEE PCP AND ONLY HIS PCP, BUT PCP DOES NOT HAVE ANY AVAILABILITY UNTIL October 17, PLEASE TRY TO SCHEDULE HIM. HE DOES NOT WANT TO COME IN TO THE OFFICE BECAUSE HE IS POST COVID.     PLEASE ADVISE

## 2022-10-04 ENCOUNTER — PATIENT MESSAGE (OUTPATIENT)
Dept: FAMILY MEDICINE CLINIC | Facility: CLINIC | Age: 67
End: 2022-10-04

## 2022-10-11 ENCOUNTER — PATIENT MESSAGE (OUTPATIENT)
Dept: FAMILY MEDICINE CLINIC | Facility: CLINIC | Age: 67
End: 2022-10-11

## 2022-10-11 NOTE — TELEPHONE ENCOUNTER
Can someone look into this please. Look at the encounter from 10/04 also, this is a second request

## 2022-10-12 ENCOUNTER — TELEPHONE (OUTPATIENT)
Dept: FAMILY MEDICINE CLINIC | Facility: CLINIC | Age: 67
End: 2022-10-12

## 2022-10-12 NOTE — TELEPHONE ENCOUNTER
Pt contacted, verbalized understanding. Needs something for pain, ANYTHING, tylenol and ibuprofen are not helping

## 2022-10-12 NOTE — TELEPHONE ENCOUNTER
PATIENT HAD MRI SCHEDULED ABOUT A MONTH OR TWO AGO BUT NECK GOT BETTER AND HE CANCELLED IT.  NOW NECK IS WORSE AGAIN AND HE WOULD LIKE TO RESCHEDULE THAT.     PLEASE CALL [phone]

## 2022-10-13 DIAGNOSIS — F41.9 ANXIETY: ICD-10-CM

## 2022-10-13 RX ORDER — FLUOXETINE HYDROCHLORIDE 20 MG/1
CAPSULE ORAL
Qty: 90 CAPSULE | Refills: 0 | Status: SHIPPED | OUTPATIENT
Start: 2022-10-13 | End: 2022-11-30 | Stop reason: SDUPTHER

## 2022-10-13 RX ORDER — DICLOFENAC SODIUM 75 MG/1
75 TABLET, DELAYED RELEASE ORAL 2 TIMES DAILY
Qty: 60 TABLET | Refills: 0 | Status: SHIPPED | OUTPATIENT
Start: 2022-10-13 | End: 2022-11-03

## 2022-10-24 ENCOUNTER — TELEPHONE (OUTPATIENT)
Dept: FAMILY MEDICINE CLINIC | Facility: CLINIC | Age: 67
End: 2022-10-24

## 2022-10-27 ENCOUNTER — PATIENT MESSAGE (OUTPATIENT)
Dept: FAMILY MEDICINE CLINIC | Facility: CLINIC | Age: 67
End: 2022-10-27

## 2022-10-28 ENCOUNTER — TELEPHONE (OUTPATIENT)
Dept: FAMILY MEDICINE CLINIC | Facility: CLINIC | Age: 67
End: 2022-10-28

## 2022-11-02 ENCOUNTER — TELEPHONE (OUTPATIENT)
Dept: FAMILY MEDICINE CLINIC | Facility: CLINIC | Age: 67
End: 2022-11-02

## 2022-11-02 ENCOUNTER — PATIENT MESSAGE (OUTPATIENT)
Dept: FAMILY MEDICINE CLINIC | Facility: CLINIC | Age: 67
End: 2022-11-02

## 2022-11-03 ENCOUNTER — TELEMEDICINE (OUTPATIENT)
Dept: FAMILY MEDICINE CLINIC | Facility: CLINIC | Age: 67
End: 2022-11-03

## 2022-11-03 DIAGNOSIS — N48.1 BALANITIS: Primary | ICD-10-CM

## 2022-11-03 DIAGNOSIS — B37.0 THRUSH: ICD-10-CM

## 2022-11-03 PROCEDURE — 99213 OFFICE O/P EST LOW 20 MIN: CPT | Performed by: NURSE PRACTITIONER

## 2022-11-03 RX ORDER — FLUCONAZOLE 150 MG/1
150 TABLET ORAL
Qty: 3 TABLET | Refills: 0 | Status: SHIPPED | OUTPATIENT
Start: 2022-11-03 | End: 2022-11-30

## 2022-11-03 NOTE — PROGRESS NOTES
Chief Complaint  Rash  This was a video and audio enabled telemedicine encounter. The patient was in home. The provider was in office.   Subjective          Fredy Palacios presents to [facility]  History of Present Illness  Pt has had redness and tenderness of his penis and in his groin skin folds for the past few days. He has h/o recurrent yeast infection. He also feels that he has thrush in his mouth.      Objective   Vital Signs:   There were no vitals taken for this visit.    There is no height or weight on file to calculate BMI.    Review of Systems   Constitutional: Negative for fatigue and fever.   Respiratory: Negative for shortness of breath.    Cardiovascular: Negative for chest pain, palpitations and leg swelling.   Skin: Positive for rash.   Neurological: Negative for syncope.   Psychiatric/Behavioral: The patient is not nervous/anxious.           Current Outpatient Medications:   •  bumetanide (BUMEX) 2 MG tablet, Take 1 tablet by mouth Daily., Disp: 90 tablet, Rfl: 1  •  FLUoxetine (PROzac) 20 MG capsule, TAKE 1 CAPSULE BY MOUTH EVERY DAY, Disp: 90 capsule, Rfl: 0  •  montelukast (SINGULAIR) 10 MG tablet, Take 1 tablet by mouth Every Night., Disp: 90 tablet, Rfl: 3  •  nystatin (MYCOSTATIN) 100,000 unit/mL suspension, Swish and swallow 5 mL 4 (Four) Times a Day., Disp: 200 mL, Rfl: 0  •  nystatin-triamcinolone (MYCOLOG) 510229-2.1 UNIT/GM-% ointment, Apply 1 application topically to the appropriate area as directed 2 (Two) Times a Day., Disp: 30 g, Rfl: 0  •  potassium chloride ER (K-TAB) 20 MEQ tablet controlled-release ER tablet, Take 1 tablet by mouth Daily., Disp: 90 tablet, Rfl: 1  •  fluconazole (Diflucan) 150 MG tablet, Take 1 tablet by mouth Every 3 (Three) Days., Disp: 3 tablet, Rfl: 0    Current Facility-Administered Medications:   •  cyanocobalamin injection 1,000 mcg, 1,000 mcg, Intramuscular, Q30 Days, Lakeisha Lynne APRN, 1,000 mcg at 06/13/22 1547       Allergies: Azithromycin, Bactrim [sulfamethoxazole-trimethoprim], Cephalexin, Levofloxacin, and Penicillins    Physical Exam  Constitutional:       Appearance: Normal appearance.   Neurological:      Mental Status: He is alert.   Psychiatric:         Mood and Affect: Mood normal.         Behavior: Behavior normal.         Thought Content: Thought content normal.         Judgment: Judgment normal.          Result Review :                   Assessment and Plan    Diagnoses and all orders for this visit:    1. Balanitis (Primary)  Comments:  Diflucan and Nystatin cream. Keep area clean and dry. I advised urology referral but he would like to hold off for now.   Orders:  -     fluconazole (Diflucan) 150 MG tablet; Take 1 tablet by mouth Every 3 (Three) Days.  Dispense: 3 tablet; Refill: 0    2. Thrush  Comments:  Nystatin as directed.  Orders:  -     nystatin (MYCOSTATIN) 100,000 unit/mL suspension; Swish and swallow 5 mL 4 (Four) Times a Day.  Dispense: 200 mL; Refill: 0                Follow Up   No follow-ups on file.  Patient was given instructions and counseling regarding his condition or for health maintenance advice. Please see specific information pulled into the AVS if appropriate.     RADHA Baig

## 2022-11-30 ENCOUNTER — OFFICE VISIT (OUTPATIENT)
Dept: FAMILY MEDICINE CLINIC | Facility: CLINIC | Age: 67
End: 2022-11-30

## 2022-11-30 VITALS
OXYGEN SATURATION: 95 % | WEIGHT: 315 LBS | DIASTOLIC BLOOD PRESSURE: 80 MMHG | SYSTOLIC BLOOD PRESSURE: 132 MMHG | HEIGHT: 71 IN | BODY MASS INDEX: 44.1 KG/M2 | HEART RATE: 92 BPM

## 2022-11-30 DIAGNOSIS — N48.1 BALANITIS: ICD-10-CM

## 2022-11-30 DIAGNOSIS — I10 PRIMARY HYPERTENSION: ICD-10-CM

## 2022-11-30 DIAGNOSIS — E56.9 VITAMIN DEFICIENCY: ICD-10-CM

## 2022-11-30 DIAGNOSIS — E78.2 MIXED HYPERLIPIDEMIA: ICD-10-CM

## 2022-11-30 DIAGNOSIS — R60.9 PERIPHERAL EDEMA: ICD-10-CM

## 2022-11-30 DIAGNOSIS — F17.220 CHEWING TOBACCO NICOTINE DEPENDENCE WITHOUT COMPLICATION: ICD-10-CM

## 2022-11-30 DIAGNOSIS — R53.83 OTHER FATIGUE: ICD-10-CM

## 2022-11-30 DIAGNOSIS — F41.1 GENERALIZED ANXIETY DISORDER: ICD-10-CM

## 2022-11-30 DIAGNOSIS — B37.0 THRUSH: ICD-10-CM

## 2022-11-30 DIAGNOSIS — E55.9 VITAMIN D DEFICIENCY: ICD-10-CM

## 2022-11-30 DIAGNOSIS — M54.50 LUMBAR PAIN: Primary | ICD-10-CM

## 2022-11-30 DIAGNOSIS — R73.9 HYPERGLYCEMIA: ICD-10-CM

## 2022-11-30 PROCEDURE — 99214 OFFICE O/P EST MOD 30 MIN: CPT | Performed by: NURSE PRACTITIONER

## 2022-11-30 PROCEDURE — 36415 COLL VENOUS BLD VENIPUNCTURE: CPT | Performed by: NURSE PRACTITIONER

## 2022-11-30 RX ORDER — VARENICLINE TARTRATE 0.5 MG/1
TABLET, FILM COATED ORAL
Qty: 95 TABLET | Refills: 0 | Status: SHIPPED | OUTPATIENT
Start: 2022-11-30 | End: 2023-02-01

## 2022-11-30 RX ORDER — FLUOXETINE HYDROCHLORIDE 20 MG/1
20 CAPSULE ORAL DAILY
Qty: 90 CAPSULE | Refills: 1 | Status: SHIPPED | OUTPATIENT
Start: 2022-11-30 | End: 2022-11-30 | Stop reason: SDUPTHER

## 2022-11-30 RX ORDER — NYSTATIN AND TRIAMCINOLONE ACETONIDE 100000; 1 [USP'U]/G; MG/G
1 OINTMENT TOPICAL 2 TIMES DAILY
Qty: 30 G | Refills: 0 | Status: SHIPPED | OUTPATIENT
Start: 2022-11-30 | End: 2023-02-01

## 2022-11-30 RX ORDER — GABAPENTIN 300 MG/1
300 CAPSULE ORAL 3 TIMES DAILY
Qty: 90 CAPSULE | Refills: 2 | Status: SHIPPED | OUTPATIENT
Start: 2022-11-30 | End: 2023-02-01 | Stop reason: SDUPTHER

## 2022-11-30 RX ORDER — FLUOXETINE HYDROCHLORIDE 40 MG/1
40 CAPSULE ORAL DAILY
Qty: 90 CAPSULE | Refills: 1 | Status: SHIPPED | OUTPATIENT
Start: 2022-11-30 | End: 2022-12-07 | Stop reason: SDUPTHER

## 2022-11-30 RX ORDER — POTASSIUM CHLORIDE 1500 MG/1
20 TABLET, FILM COATED, EXTENDED RELEASE ORAL DAILY
Qty: 90 TABLET | Refills: 1 | Status: SHIPPED | OUTPATIENT
Start: 2022-11-30 | End: 2023-02-01 | Stop reason: SDUPTHER

## 2022-11-30 RX ORDER — BUMETANIDE 2 MG/1
2 TABLET ORAL DAILY
Qty: 90 TABLET | Refills: 1 | Status: SHIPPED | OUTPATIENT
Start: 2022-11-30 | End: 2022-12-07 | Stop reason: SDUPTHER

## 2022-11-30 NOTE — PROGRESS NOTES
"Chief Complaint  controlled substances and Nicotine Dependence (Wants to stop dipping)    Subjective          Fredy Palacios presents to [facility]  History of Present Illness  Pt is here for refills on his medications. He has had ongoing low back pain and would like to try Gabapentin. He has had sinus drainage and congestion. He has had intermittent thrush and feels this could be related to his dip. He would like to try chantix. He has had worsened anxiety in the past few months. He stopped his Bumex for several days and has had increased swelling since then. He denies chest pain or shortness of breath.   Nicotine Dependence  Symptoms are negative for fatigue.       Objective   Vital Signs:   /80   Pulse 92   Ht 180.3 cm (71\")   Wt (!) 147 kg (323 lb)   SpO2 95%   BMI 45.05 kg/m²     Body mass index is 45.05 kg/m².    Review of Systems   Constitutional: Negative for fatigue and fever.   HENT: Positive for congestion and sinus pressure.    Respiratory: Negative for shortness of breath.    Cardiovascular: Negative for chest pain, palpitations and leg swelling.   Musculoskeletal: Positive for back pain.   Neurological: Negative for syncope.   Psychiatric/Behavioral: The patient is nervous/anxious.           Current Outpatient Medications:   •  bumetanide (BUMEX) 2 MG tablet, Take 1 tablet by mouth Daily., Disp: 90 tablet, Rfl: 1  •  FLUoxetine (PROzac) 40 MG capsule, Take 1 capsule by mouth Daily., Disp: 90 capsule, Rfl: 1  •  montelukast (SINGULAIR) 10 MG tablet, Take 1 tablet by mouth Every Night., Disp: 90 tablet, Rfl: 3  •  nystatin (MYCOSTATIN) 100,000 unit/mL suspension, Swish and swallow 5 mL 4 (Four) Times a Day., Disp: 200 mL, Rfl: 0  •  nystatin-triamcinolone (MYCOLOG) 330767-6.1 UNIT/GM-% ointment, Apply 1 application topically to the appropriate area as directed 2 (Two) Times a Day., Disp: 30 g, Rfl: 0  •  potassium chloride ER (K-TAB) 20 MEQ tablet " controlled-release ER tablet, Take 1 tablet by mouth Daily., Disp: 90 tablet, Rfl: 1  •  gabapentin (NEURONTIN) 300 MG capsule, Take 1 capsule by mouth 3 (Three) Times a Day., Disp: 90 capsule, Rfl: 2  •  varenicline (Chantix) 0.5 MG tablet, 1 tablet daily 3 days, 1 tablet twice daily x 4 days, then 2 tablets twice daily, Disp: 95 tablet, Rfl: 0    Current Facility-Administered Medications:   •  cyanocobalamin injection 1,000 mcg, 1,000 mcg, Intramuscular, Q30 Days, Lakeisha Lynne, APRN, 1,000 mcg at 06/13/22 1547      Allergies: Azithromycin, Bactrim [sulfamethoxazole-trimethoprim], Cephalexin, Levofloxacin, and Penicillins    Physical Exam  Constitutional:       Appearance: Normal appearance.   HENT:      Head: Normocephalic.   Eyes:      Conjunctiva/sclera: Conjunctivae normal.      Pupils: Pupils are equal, round, and reactive to light.   Cardiovascular:      Rate and Rhythm: Normal rate and regular rhythm.      Heart sounds: Normal heart sounds.   Pulmonary:      Effort: Pulmonary effort is normal.      Breath sounds: Normal breath sounds.   Abdominal:      Tenderness: There is no abdominal tenderness.   Musculoskeletal:         General: Normal range of motion.   Skin:     General: Skin is warm and dry.      Capillary Refill: Capillary refill takes less than 2 seconds.   Neurological:      General: No focal deficit present.      Mental Status: He is alert and oriented to person, place, and time.   Psychiatric:         Mood and Affect: Mood normal.         Behavior: Behavior normal.         Thought Content: Thought content normal.         Judgment: Judgment normal.          Result Review :                   Assessment and Plan    Diagnoses and all orders for this visit:    1. Lumbar pain (Primary)  Comments:  Try Gabapentin. RTC for worsened sx and if not improving in 1 month.   Orders:  -     gabapentin (NEURONTIN) 300 MG capsule; Take 1 capsule by mouth 3 (Three) Times a Day.  Dispense: 90 capsule; Refill:  2    2. Thrush  Comments:  Nystatin as directed.  Orders:  -     nystatin (MYCOSTATIN) 100,000 unit/mL suspension; Swish and swallow 5 mL 4 (Four) Times a Day.  Dispense: 200 mL; Refill: 0    3. Primary hypertension  Comments:  Continue current medications. We discussed diet and exercise. Labs drawn.   Orders:  -     bumetanide (BUMEX) 2 MG tablet; Take 1 tablet by mouth Daily.  Dispense: 90 tablet; Refill: 1    4. Peripheral edema  Comments:  Continue Bumex. Labs drawn. RTC for worsened sx and if not improving in 2 weeks.   Orders:  -     bumetanide (BUMEX) 2 MG tablet; Take 1 tablet by mouth Daily.  Dispense: 90 tablet; Refill: 1  -     potassium chloride ER (K-TAB) 20 MEQ tablet controlled-release ER tablet; Take 1 tablet by mouth Daily.  Dispense: 90 tablet; Refill: 1    5. Balanitis  Comments:  Keep area clean and dry. Use ointment if sx return.   Orders:  -     nystatin-triamcinolone (MYCOLOG) 305170-4.1 UNIT/GM-% ointment; Apply 1 application topically to the appropriate area as directed 2 (Two) Times a Day.  Dispense: 30 g; Refill: 0    6. Mixed hyperlipidemia  Comments:  Labs drawn.   Orders:  -     CBC & Differential; Future  -     Comprehensive Metabolic Panel; Future  -     Cancel: Lipid Panel; Future  -     CBC & Differential  -     Comprehensive Metabolic Panel    7. Hyperglycemia  -     Hemoglobin A1c; Future  -     Hemoglobin A1c    8. Vitamin deficiency  -     Vitamin B12; Future  -     Folate; Future  -     Vitamin B12  -     Folate    9. Vitamin D deficiency  -     Vitamin D,25-Hydroxy; Future  -     Vitamin D,25-Hydroxy    10. Other fatigue  -     TSH; Future  -     TSH    11. Generalized anxiety disorder  Comments:  Increase Prozac to 40mg. RTC if not improving in 1 month.   Orders:  -     FLUoxetine (PROzac) 40 MG capsule; Take 1 capsule by mouth Daily.  Dispense: 90 capsule; Refill: 1    12. Chewing tobacco nicotine dependence without complication  -     varenicline (Chantix) 0.5 MG tablet; 1  tablet daily 3 days, 1 tablet twice daily x 4 days, then 2 tablets twice daily  Dispense: 95 tablet; Refill: 0    Other orders  -     Discontinue: FLUoxetine (PROzac) 20 MG capsule; Take 1 capsule by mouth Daily.  Dispense: 90 capsule; Refill: 1                Follow Up   Return in about 1 week (around 12/7/2022) for if not improving or sooner if symptoms worsen.  Patient was given instructions and counseling regarding his condition or for health maintenance advice. Please see specific information pulled into the AVS if appropriate.     Lakeisha Lynne, APRN

## 2022-12-01 ENCOUNTER — TELEPHONE (OUTPATIENT)
Dept: FAMILY MEDICINE CLINIC | Facility: CLINIC | Age: 67
End: 2022-12-01

## 2022-12-01 DIAGNOSIS — F41.1 GENERALIZED ANXIETY DISORDER: ICD-10-CM

## 2022-12-01 LAB
25(OH)D3+25(OH)D2 SERPL-MCNC: 32.7 NG/ML (ref 30–100)
ALBUMIN SERPL-MCNC: 4 G/DL (ref 3.8–4.8)
ALBUMIN/GLOB SERPL: 1.7 {RATIO} (ref 1.2–2.2)
ALP SERPL-CCNC: 81 IU/L (ref 44–121)
ALT SERPL-CCNC: 23 IU/L (ref 0–44)
AST SERPL-CCNC: 25 IU/L (ref 0–40)
BASOPHILS # BLD AUTO: 0 X10E3/UL (ref 0–0.2)
BASOPHILS NFR BLD AUTO: 0 %
BILIRUB SERPL-MCNC: 0.6 MG/DL (ref 0–1.2)
BUN SERPL-MCNC: 15 MG/DL (ref 8–27)
BUN/CREAT SERPL: 11 (ref 10–24)
CALCIUM SERPL-MCNC: 9.2 MG/DL (ref 8.6–10.2)
CHLORIDE SERPL-SCNC: 101 MMOL/L (ref 96–106)
CO2 SERPL-SCNC: 22 MMOL/L (ref 20–29)
CREAT SERPL-MCNC: 1.34 MG/DL (ref 0.76–1.27)
EGFRCR SERPLBLD CKD-EPI 2021: 57 ML/MIN/1.73
EOSINOPHIL # BLD AUTO: 0.3 X10E3/UL (ref 0–0.4)
EOSINOPHIL NFR BLD AUTO: 3 %
ERYTHROCYTE [DISTWIDTH] IN BLOOD BY AUTOMATED COUNT: 12.5 % (ref 11.6–15.4)
FOLATE SERPL-MCNC: 16.5 NG/ML
GLOBULIN SER CALC-MCNC: 2.4 G/DL (ref 1.5–4.5)
GLUCOSE SERPL-MCNC: 86 MG/DL (ref 70–99)
HBA1C MFR BLD: 5.5 % (ref 4.8–5.6)
HCT VFR BLD AUTO: 46.8 % (ref 37.5–51)
HGB BLD-MCNC: 16.2 G/DL (ref 13–17.7)
IMM GRANULOCYTES # BLD AUTO: 0 X10E3/UL (ref 0–0.1)
IMM GRANULOCYTES NFR BLD AUTO: 0 %
LYMPHOCYTES # BLD AUTO: 2.6 X10E3/UL (ref 0.7–3.1)
LYMPHOCYTES NFR BLD AUTO: 27 %
MCH RBC QN AUTO: 31.3 PG (ref 26.6–33)
MCHC RBC AUTO-ENTMCNC: 34.6 G/DL (ref 31.5–35.7)
MCV RBC AUTO: 90 FL (ref 79–97)
MONOCYTES # BLD AUTO: 0.8 X10E3/UL (ref 0.1–0.9)
MONOCYTES NFR BLD AUTO: 8 %
NEUTROPHILS # BLD AUTO: 5.9 X10E3/UL (ref 1.4–7)
NEUTROPHILS NFR BLD AUTO: 62 %
PLATELET # BLD AUTO: 204 X10E3/UL (ref 150–450)
POTASSIUM SERPL-SCNC: 3.8 MMOL/L (ref 3.5–5.2)
PROT SERPL-MCNC: 6.4 G/DL (ref 6–8.5)
RBC # BLD AUTO: 5.18 X10E6/UL (ref 4.14–5.8)
SODIUM SERPL-SCNC: 141 MMOL/L (ref 134–144)
TSH SERPL DL<=0.005 MIU/L-ACNC: 1.86 UIU/ML (ref 0.45–4.5)
VIT B12 SERPL-MCNC: 276 PG/ML (ref 232–1245)
WBC # BLD AUTO: 9.7 X10E3/UL (ref 3.4–10.8)

## 2022-12-01 NOTE — TELEPHONE ENCOUNTER
Pt wants to know the signs of an ulcer.. Asked if he thought he was having sx and he said he wasn't sure, hes having heart burn here lately and has never experienced that before. Wanted to know if it could be an ulcer

## 2022-12-01 NOTE — TELEPHONE ENCOUNTER
Caller: Fredy Palacios    Relationship to patient: Self    Best call back number: [phone]    Patient is needing: PATIENT STATED THAT varenicline (Chantix) 0.5 MG tablet WOULD NEED PRIOR AUTHORIZATION     PLEASE ADVISE

## 2022-12-02 NOTE — TELEPHONE ENCOUNTER
Signs may be reflux or epigastric abdominal pain. I can send him an antacid which is the treatment. Let me know. Thanks!

## 2022-12-06 ENCOUNTER — PATIENT MESSAGE (OUTPATIENT)
Dept: FAMILY MEDICINE CLINIC | Facility: CLINIC | Age: 67
End: 2022-12-06

## 2022-12-06 RX ORDER — PANTOPRAZOLE SODIUM 40 MG/1
40 TABLET, DELAYED RELEASE ORAL DAILY
Qty: 90 TABLET | Refills: 1 | Status: SHIPPED | OUTPATIENT
Start: 2022-12-06 | End: 2023-02-01

## 2022-12-07 ENCOUNTER — TELEPHONE (OUTPATIENT)
Dept: FAMILY MEDICINE CLINIC | Facility: CLINIC | Age: 67
End: 2022-12-07

## 2022-12-07 DIAGNOSIS — I10 PRIMARY HYPERTENSION: ICD-10-CM

## 2022-12-07 DIAGNOSIS — R60.9 PERIPHERAL EDEMA: ICD-10-CM

## 2022-12-07 DIAGNOSIS — F41.1 GENERALIZED ANXIETY DISORDER: ICD-10-CM

## 2022-12-07 RX ORDER — BUMETANIDE 2 MG/1
TABLET ORAL
Qty: 100 TABLET | Refills: 1 | Status: SHIPPED | OUTPATIENT
Start: 2022-12-07 | End: 2022-12-07 | Stop reason: SDUPTHER

## 2022-12-07 RX ORDER — FLUOXETINE HYDROCHLORIDE 40 MG/1
CAPSULE ORAL
Qty: 100 CAPSULE | Refills: 1 | Status: SHIPPED | OUTPATIENT
Start: 2022-12-07 | End: 2022-12-07 | Stop reason: SDUPTHER

## 2022-12-07 RX ORDER — FLUOXETINE HYDROCHLORIDE 40 MG/1
40 CAPSULE ORAL DAILY
Qty: 90 CAPSULE | Refills: 1 | Status: SHIPPED | OUTPATIENT
Start: 2022-12-07

## 2022-12-07 RX ORDER — BUMETANIDE 2 MG/1
2 TABLET ORAL DAILY
Qty: 90 TABLET | Refills: 1 | Status: SHIPPED | OUTPATIENT
Start: 2022-12-07

## 2022-12-07 NOTE — TELEPHONE ENCOUNTER
Contacted about other message.   Pt said that you told him to take 4 fluid pills a day for a few days but that will cause him to run out early and pharm wont refill next RF early because rx says to take qd. Wanted to knnow if you could send something over to allow him to fill early.

## 2022-12-07 NOTE — TELEPHONE ENCOUNTER
Caller: [facility] - [address] [facility] [phone] [facility] [phone] [facility]    Relationship: Pharmacy    Best call back number: [phone]    Requested Prescriptions:   Requested Prescriptions      No prescriptions requested or ordered in this encounter      bumetanide (BUMEX) 2 MG tablet    Pharmacy where request should be sent:      Additional details provided by patient:     DIRECTIONS DO NOT MAKE SENSE        Kyle Lee Rep   12/07/22 14:03 EST

## 2023-02-01 ENCOUNTER — TELEMEDICINE (OUTPATIENT)
Dept: FAMILY MEDICINE CLINIC | Facility: CLINIC | Age: 68
End: 2023-02-01
Payer: MEDICARE

## 2023-02-01 DIAGNOSIS — K21.9 GASTROESOPHAGEAL REFLUX DISEASE WITHOUT ESOPHAGITIS: Primary | ICD-10-CM

## 2023-02-01 DIAGNOSIS — R19.7 DIARRHEA, UNSPECIFIED TYPE: ICD-10-CM

## 2023-02-01 DIAGNOSIS — M54.50 LUMBAR PAIN: ICD-10-CM

## 2023-02-01 DIAGNOSIS — R60.9 PERIPHERAL EDEMA: ICD-10-CM

## 2023-02-01 PROBLEM — B35.3 TINEA PEDIS: Status: RESOLVED | Noted: 2022-05-03 | Resolved: 2023-02-01

## 2023-02-01 PROCEDURE — 99214 OFFICE O/P EST MOD 30 MIN: CPT | Performed by: NURSE PRACTITIONER

## 2023-02-01 RX ORDER — GABAPENTIN 300 MG/1
300 CAPSULE ORAL 3 TIMES DAILY
Qty: 90 CAPSULE | Refills: 2 | Status: SHIPPED | OUTPATIENT
Start: 2023-02-01

## 2023-02-01 RX ORDER — POTASSIUM CHLORIDE 1500 MG/1
20 TABLET, FILM COATED, EXTENDED RELEASE ORAL DAILY
Qty: 90 TABLET | Refills: 1 | Status: SHIPPED | OUTPATIENT
Start: 2023-02-01

## 2023-02-01 RX ORDER — OMEPRAZOLE 40 MG/1
40 CAPSULE, DELAYED RELEASE ORAL DAILY
Qty: 90 CAPSULE | Refills: 1 | Status: SHIPPED | OUTPATIENT
Start: 2023-02-01

## 2023-02-01 NOTE — PROGRESS NOTES
Chief Complaint  Nausea (Nausea, diarrhea x 1 week.), Back Pain, and Heartburn  This was a video and audio enabled telemedicine encounter. The patient was in home. The provider was in office.   Subjective          Fredy Palacios presents to [facility]  History of Present Illness  Pt is here for refills on his medications. He is taking Gabapentin for neuropathy which is controlled. He had covid-19 3 weeks ago and has had diarrhea intermittently. He has not been taking KCl regularly. He stopped Protonix due to a foul taste in his mouth.       Objective   Vital Signs:   There were no vitals taken for this visit.    There is no height or weight on file to calculate BMI.    Review of Systems   Constitutional: Negative for fatigue and fever.   Respiratory: Negative for shortness of breath.    Cardiovascular: Negative for chest pain, palpitations and leg swelling.   Neurological: Negative for syncope.   Psychiatric/Behavioral: The patient is not nervous/anxious.           Current Outpatient Medications:   •  bumetanide (BUMEX) 2 MG tablet, Take 1 tablet by mouth Daily., Disp: 90 tablet, Rfl: 1  •  FLUoxetine (PROzac) 40 MG capsule, Take 1 capsule by mouth Daily., Disp: 90 capsule, Rfl: 1  •  gabapentin (NEURONTIN) 300 MG capsule, Take 1 capsule by mouth 3 (Three) Times a Day., Disp: 90 capsule, Rfl: 2  •  montelukast (SINGULAIR) 10 MG tablet, Take 1 tablet by mouth Every Night., Disp: 90 tablet, Rfl: 3  •  potassium chloride ER (K-TAB) 20 MEQ tablet controlled-release ER tablet, Take 1 tablet by mouth Daily., Disp: 90 tablet, Rfl: 1  •  omeprazole (priLOSEC) 40 MG capsule, Take 1 capsule by mouth Daily., Disp: 90 capsule, Rfl: 1  No current facility-administered medications for this visit.      Allergies: Azithromycin, Bactrim [sulfamethoxazole-trimethoprim], Cephalexin, Levofloxacin, and Penicillins    Physical Exam  Constitutional:       Appearance: Normal appearance.   Neurological:       Mental Status: He is alert.   Psychiatric:         Mood and Affect: Mood normal.         Behavior: Behavior normal.         Thought Content: Thought content normal.         Judgment: Judgment normal.          Result Review :                   Assessment and Plan    Diagnoses and all orders for this visit:    1. Gastroesophageal reflux disease without esophagitis (Primary)  Comments:  Try Omeprazole. RTC for worsened sx and if not improving in 1 month.   Orders:  -     omeprazole (priLOSEC) 40 MG capsule; Take 1 capsule by mouth Daily.  Dispense: 90 capsule; Refill: 1    2. Lumbar pain  Comments:  Continue Gabapentin. RTC for follow up in 3 months.   Orders:  -     gabapentin (NEURONTIN) 300 MG capsule; Take 1 capsule by mouth 3 (Three) Times a Day.  Dispense: 90 capsule; Refill: 2    3. Peripheral edema  Comments:  Continue Bumex. Restart KCl.   Orders:  -     potassium chloride ER (K-TAB) 20 MEQ tablet controlled-release ER tablet; Take 1 tablet by mouth Daily.  Dispense: 90 tablet; Refill: 1    4. Diarrhea, unspecified type  Comments:  Alfalfa BRAT diet and increase fluids. We may order additional labs if not improving.                 Follow Up   Return in about 3 months (around 5/1/2023) for Recheck.  Patient was given instructions and counseling regarding his condition or for health maintenance advice. Please see specific information pulled into the AVS if appropriate.     RADHA Baig

## 2023-06-02 ENCOUNTER — OFFICE VISIT (OUTPATIENT)
Dept: FAMILY MEDICINE CLINIC | Facility: CLINIC | Age: 68
End: 2023-06-02

## 2023-06-02 VITALS
SYSTOLIC BLOOD PRESSURE: 124 MMHG | RESPIRATION RATE: 18 BRPM | DIASTOLIC BLOOD PRESSURE: 80 MMHG | HEART RATE: 76 BPM | TEMPERATURE: 97.3 F | WEIGHT: 315 LBS | BODY MASS INDEX: 44.1 KG/M2 | HEIGHT: 71 IN

## 2023-06-02 DIAGNOSIS — E78.2 MIXED HYPERLIPIDEMIA: Primary | ICD-10-CM

## 2023-06-02 DIAGNOSIS — I10 PRIMARY HYPERTENSION: ICD-10-CM

## 2023-06-02 DIAGNOSIS — E55.9 VITAMIN D DEFICIENCY: ICD-10-CM

## 2023-06-02 DIAGNOSIS — J30.1 ALLERGIC RHINITIS DUE TO POLLEN, UNSPECIFIED SEASONALITY: ICD-10-CM

## 2023-06-02 DIAGNOSIS — F51.01 PRIMARY INSOMNIA: ICD-10-CM

## 2023-06-02 DIAGNOSIS — R73.9 HYPERGLYCEMIA: ICD-10-CM

## 2023-06-02 DIAGNOSIS — M54.50 LUMBAR PAIN: ICD-10-CM

## 2023-06-02 DIAGNOSIS — R53.83 OTHER FATIGUE: ICD-10-CM

## 2023-06-02 DIAGNOSIS — R11.0 NAUSEA: ICD-10-CM

## 2023-06-02 DIAGNOSIS — R60.9 PERIPHERAL EDEMA: ICD-10-CM

## 2023-06-02 DIAGNOSIS — E56.9 VITAMIN DEFICIENCY: ICD-10-CM

## 2023-06-02 DIAGNOSIS — K21.9 GASTROESOPHAGEAL REFLUX DISEASE WITHOUT ESOPHAGITIS: ICD-10-CM

## 2023-06-02 DIAGNOSIS — F41.1 GENERALIZED ANXIETY DISORDER: ICD-10-CM

## 2023-06-02 PROCEDURE — 1159F MED LIST DOCD IN RCRD: CPT | Performed by: NURSE PRACTITIONER

## 2023-06-02 PROCEDURE — 99214 OFFICE O/P EST MOD 30 MIN: CPT | Performed by: NURSE PRACTITIONER

## 2023-06-02 PROCEDURE — 1160F RVW MEDS BY RX/DR IN RCRD: CPT | Performed by: NURSE PRACTITIONER

## 2023-06-02 PROCEDURE — 3074F SYST BP LT 130 MM HG: CPT | Performed by: NURSE PRACTITIONER

## 2023-06-02 PROCEDURE — 3079F DIAST BP 80-89 MM HG: CPT | Performed by: NURSE PRACTITIONER

## 2023-06-02 RX ORDER — POTASSIUM CHLORIDE 1500 MG/1
20 TABLET, EXTENDED RELEASE ORAL DAILY
Qty: 90 TABLET | Refills: 1 | Status: SHIPPED | OUTPATIENT
Start: 2023-06-02

## 2023-06-02 RX ORDER — GABAPENTIN 300 MG/1
300 CAPSULE ORAL 3 TIMES DAILY
Qty: 270 CAPSULE | Refills: 0 | Status: SHIPPED | OUTPATIENT
Start: 2023-06-02

## 2023-06-02 RX ORDER — TRAZODONE HYDROCHLORIDE 50 MG/1
TABLET ORAL
Qty: 180 TABLET | Refills: 1 | Status: SHIPPED | OUTPATIENT
Start: 2023-06-02

## 2023-06-02 RX ORDER — MONTELUKAST SODIUM 10 MG/1
10 TABLET ORAL NIGHTLY
Qty: 90 TABLET | Refills: 3 | Status: SHIPPED | OUTPATIENT
Start: 2023-06-02

## 2023-06-02 RX ORDER — BUMETANIDE 2 MG/1
2 TABLET ORAL DAILY
Qty: 90 TABLET | Refills: 1 | Status: SHIPPED | OUTPATIENT
Start: 2023-06-02

## 2023-06-02 RX ORDER — OMEPRAZOLE 40 MG/1
40 CAPSULE, DELAYED RELEASE ORAL DAILY
Qty: 90 CAPSULE | Refills: 1 | Status: SHIPPED | OUTPATIENT
Start: 2023-06-02

## 2023-06-02 RX ORDER — FLUOXETINE HYDROCHLORIDE 40 MG/1
40 CAPSULE ORAL DAILY
Qty: 90 CAPSULE | Refills: 1 | Status: SHIPPED | OUTPATIENT
Start: 2023-06-02

## 2023-06-02 NOTE — PROGRESS NOTES
"Chief Complaint  GI Problem and Med Refill    Subjective          Fredy Palacios presents to [facility]  History of Present Illness  Patient is here to follow-up on his medications.  He has had increased anxiety and insomnia recently.  He has had ongoing abdominal discomfort, bloating, and nausea for the past year.  He stopped dipping and has had increased appetite recently.  He states his neuropathy is controlled.  Abdominal Pain  This is a chronic problem. The current episode started 1 to 4 weeks ago. The onset quality is sudden. The problem occurs daily. The problem has been unchanged. The pain is located in the periumbilical region. The pain is at a severity of 7/10. The quality of the pain is burning. The abdominal pain does not radiate. Associated symptoms include anorexia, arthralgias, diarrhea, flatus, frequency and vomiting. Pertinent negatives include no belching, constipation, dysuria, fever, headaches, hematochezia, hematuria, melena, myalgias, nausea or weight loss. The pain is aggravated by vomiting. The pain is relieved by eating.       Objective   Vital Signs:   /80   Pulse 76   Temp 97.3 °F (36.3 °C) (Infrared)   Resp 18   Ht 180.3 cm (70.98\")   Wt (!) 145 kg (320 lb 9.6 oz)   BMI 44.73 kg/m²     Body mass index is 44.73 kg/m².    Review of Systems   Constitutional: Negative for fatigue, fever and unexpected weight loss.   Respiratory: Negative for shortness of breath.    Cardiovascular: Negative for chest pain, palpitations and leg swelling.   Gastrointestinal: Positive for abdominal pain, anorexia, diarrhea, flatus and vomiting. Negative for constipation, hematochezia, melena and nausea.   Genitourinary: Positive for frequency. Negative for dysuria and hematuria.   Musculoskeletal: Positive for arthralgias. Negative for myalgias.   Neurological: Negative for syncope.   Psychiatric/Behavioral: The patient is not nervous/anxious.           Current " Outpatient Medications:   •  bumetanide (BUMEX) 2 MG tablet, Take 1 tablet by mouth Daily., Disp: 90 tablet, Rfl: 1  •  FLUoxetine (PROzac) 40 MG capsule, Take 1 capsule by mouth Daily., Disp: 90 capsule, Rfl: 1  •  gabapentin (NEURONTIN) 300 MG capsule, Take 1 capsule by mouth 3 (Three) Times a Day., Disp: 270 capsule, Rfl: 0  •  montelukast (SINGULAIR) 10 MG tablet, Take 1 tablet by mouth Every Night., Disp: 90 tablet, Rfl: 3  •  omeprazole (priLOSEC) 40 MG capsule, Take 1 capsule by mouth Daily., Disp: 90 capsule, Rfl: 1  •  potassium chloride ER (K-TAB) 20 MEQ tablet controlled-release ER tablet, Take 1 tablet by mouth Daily., Disp: 90 tablet, Rfl: 1  •  traZODone (DESYREL) 50 MG tablet, 1-2 tablets HS, Disp: 180 tablet, Rfl: 1      Allergies: Azithromycin, Bactrim [sulfamethoxazole-trimethoprim], Cephalexin, Levofloxacin, and Penicillins    Physical Exam  Constitutional:       Appearance: Normal appearance.   HENT:      Head: Normocephalic.   Eyes:      Conjunctiva/sclera: Conjunctivae normal.      Pupils: Pupils are equal, round, and reactive to light.   Cardiovascular:      Rate and Rhythm: Normal rate and regular rhythm.      Heart sounds: Normal heart sounds.   Pulmonary:      Effort: Pulmonary effort is normal.      Breath sounds: Normal breath sounds.   Abdominal:      Tenderness: There is no abdominal tenderness.   Musculoskeletal:         General: Normal range of motion.   Skin:     General: Skin is warm and dry.      Capillary Refill: Capillary refill takes less than 2 seconds.   Neurological:      General: No focal deficit present.      Mental Status: He is alert and oriented to person, place, and time.   Psychiatric:         Mood and Affect: Mood normal.         Behavior: Behavior normal.         Thought Content: Thought content normal.         Judgment: Judgment normal.          Result Review :                   Assessment and Plan    Diagnoses and all orders for this visit:    1. Mixed  hyperlipidemia (Primary)  -     CBC & Differential  -     Comprehensive Metabolic Panel  -     Lipid Panel    2. Primary hypertension  Comments:  Continue current medications. We discussed diet and exercise. Labs drawn.   Orders:  -     bumetanide (BUMEX) 2 MG tablet; Take 1 tablet by mouth Daily.  Dispense: 90 tablet; Refill: 1    3. Peripheral edema  Comments:  Continue Bumex. Labs drawn. RTC for worsened sx and if not improving in 2 weeks.   Orders:  -     bumetanide (BUMEX) 2 MG tablet; Take 1 tablet by mouth Daily.  Dispense: 90 tablet; Refill: 1  -     potassium chloride ER (K-TAB) 20 MEQ tablet controlled-release ER tablet; Take 1 tablet by mouth Daily.  Dispense: 90 tablet; Refill: 1    4. Allergic rhinitis due to pollen, unspecified seasonality  Comments:  Continue meds. Return for fasting labs.  Orders:  -     montelukast (SINGULAIR) 10 MG tablet; Take 1 tablet by mouth Every Night.  Dispense: 90 tablet; Refill: 3    5. Gastroesophageal reflux disease without esophagitis  Comments:  Try Omeprazole. RTC for worsened sx and if not improving in 1 month.   Orders:  -     omeprazole (priLOSEC) 40 MG capsule; Take 1 capsule by mouth Daily.  Dispense: 90 capsule; Refill: 1    6. Generalized anxiety disorder  Comments:  Continue Prozac 40mg.  Add trazodone at bedtime.  RTC if not improving in 1 month.   Orders:  -     FLUoxetine (PROzac) 40 MG capsule; Take 1 capsule by mouth Daily.  Dispense: 90 capsule; Refill: 1    7. Lumbar pain  Comments:  Continue Gabapentin. RTC for follow up in 3 months.   Orders:  -     gabapentin (NEURONTIN) 300 MG capsule; Take 1 capsule by mouth 3 (Three) Times a Day.  Dispense: 270 capsule; Refill: 0    8. Vitamin deficiency  -     Vitamin B12  -     Folate    9. Vitamin D deficiency  -     Vitamin D,25-Hydroxy    10. Hyperglycemia  -     Hemoglobin A1c    11. Other fatigue  -     TSH    12. Primary insomnia  Comments:  Try trazodone.  Return in 1 month if not improving.  Orders:  -      traZODone (DESYREL) 50 MG tablet; 1-2 tablets HS  Dispense: 180 tablet; Refill: 1    13. Nausea  Comments:  I offered GI referral and colonoscopy.  He declined for now.  Return for worsening symptoms.  Return for labs.                Follow Up   Return in about 3 months (around 9/2/2023) for Recheck.  Patient was given instructions and counseling regarding his condition or for health maintenance advice. Please see specific information pulled into the AVS if appropriate.     RADHA Baig

## 2023-06-29 LAB
25(OH)D3+25(OH)D2 SERPL-MCNC: 36.6 NG/ML (ref 30–100)
ALBUMIN SERPL-MCNC: 4 G/DL (ref 3.8–4.8)
ALBUMIN/GLOB SERPL: 1.7 {RATIO} (ref 1.2–2.2)
ALP SERPL-CCNC: 98 IU/L (ref 44–121)
ALT SERPL-CCNC: 20 IU/L (ref 0–44)
AST SERPL-CCNC: 24 IU/L (ref 0–40)
BASOPHILS # BLD AUTO: 0 X10E3/UL (ref 0–0.2)
BASOPHILS NFR BLD AUTO: 0 %
BILIRUB SERPL-MCNC: 0.5 MG/DL (ref 0–1.2)
BUN SERPL-MCNC: 17 MG/DL (ref 8–27)
BUN/CREAT SERPL: 12 (ref 10–24)
CALCIUM SERPL-MCNC: 9.1 MG/DL (ref 8.6–10.2)
CHLORIDE SERPL-SCNC: 102 MMOL/L (ref 96–106)
CHOLEST SERPL-MCNC: 222 MG/DL (ref 100–199)
CO2 SERPL-SCNC: 26 MMOL/L (ref 20–29)
CREAT SERPL-MCNC: 1.4 MG/DL (ref 0.76–1.27)
EGFRCR SERPLBLD CKD-EPI 2021: 54 ML/MIN/1.73
EOSINOPHIL # BLD AUTO: 0.4 X10E3/UL (ref 0–0.4)
EOSINOPHIL NFR BLD AUTO: 4 %
ERYTHROCYTE [DISTWIDTH] IN BLOOD BY AUTOMATED COUNT: 13.3 % (ref 11.6–15.4)
FOLATE SERPL-MCNC: 13.7 NG/ML
GLOBULIN SER CALC-MCNC: 2.4 G/DL (ref 1.5–4.5)
GLUCOSE SERPL-MCNC: 127 MG/DL (ref 70–99)
HBA1C MFR BLD: 5.8 % (ref 4.8–5.6)
HCT VFR BLD AUTO: 45.9 % (ref 37.5–51)
HDLC SERPL-MCNC: 51 MG/DL
HGB BLD-MCNC: 15.7 G/DL (ref 13–17.7)
IMM GRANULOCYTES # BLD AUTO: 0.1 X10E3/UL (ref 0–0.1)
IMM GRANULOCYTES NFR BLD AUTO: 1 %
LDLC SERPL CALC-MCNC: 148 MG/DL (ref 0–99)
LYMPHOCYTES # BLD AUTO: 3.4 X10E3/UL (ref 0.7–3.1)
LYMPHOCYTES NFR BLD AUTO: 36 %
MCH RBC QN AUTO: 31.8 PG (ref 26.6–33)
MCHC RBC AUTO-ENTMCNC: 34.2 G/DL (ref 31.5–35.7)
MCV RBC AUTO: 93 FL (ref 79–97)
MONOCYTES # BLD AUTO: 0.5 X10E3/UL (ref 0.1–0.9)
MONOCYTES NFR BLD AUTO: 5 %
NEUTROPHILS # BLD AUTO: 5 X10E3/UL (ref 1.4–7)
NEUTROPHILS NFR BLD AUTO: 54 %
PLATELET # BLD AUTO: 186 X10E3/UL (ref 150–450)
POTASSIUM SERPL-SCNC: 4.6 MMOL/L (ref 3.5–5.2)
PROT SERPL-MCNC: 6.4 G/DL (ref 6–8.5)
RBC # BLD AUTO: 4.94 X10E6/UL (ref 4.14–5.8)
SODIUM SERPL-SCNC: 144 MMOL/L (ref 134–144)
TRIGL SERPL-MCNC: 131 MG/DL (ref 0–149)
TSH SERPL DL<=0.005 MIU/L-ACNC: 3.98 UIU/ML (ref 0.45–4.5)
VIT B12 SERPL-MCNC: 1106 PG/ML (ref 232–1245)
VLDLC SERPL CALC-MCNC: 23 MG/DL (ref 5–40)
WBC # BLD AUTO: 9.3 X10E3/UL (ref 3.4–10.8)

## 2023-07-01 DIAGNOSIS — M54.50 LUMBAR PAIN: ICD-10-CM

## 2023-07-06 ENCOUNTER — PATIENT MESSAGE (OUTPATIENT)
Dept: FAMILY MEDICINE CLINIC | Facility: CLINIC | Age: 68
End: 2023-07-06

## 2023-08-16 DIAGNOSIS — M54.50 LUMBAR PAIN: ICD-10-CM

## 2023-08-16 NOTE — TELEPHONE ENCOUNTER
I sent 7/3 for 90 days to [facility] so he shouldn't be out until October. Thanks!

## 2023-08-16 NOTE — TELEPHONE ENCOUNTER
Caller: Palacios Fredy ELIDIA    Relationship: Self    Best call back number: [phone]     Requested Prescriptions:   Requested Prescriptions     Pending Prescriptions Disp Refills    gabapentin (NEURONTIN) 300 MG capsule 270 capsule 0     Sig: Take 1 capsule by mouth 3 (Three) Times a Day.        Pharmacy where request should be sent: [facility] [address] - [phone]  - [phone] FX     Last office visit with prescribing clinician: 6/2/2023   Last telemedicine visit with prescribing clinician: 7/3/2023   Next office visit with prescribing clinician: 9/6/2023     Additional details provided by patient: PATIENT STATES HE RECEIVED A MESSAGE FROM THE PHARMACY THAT HIS PRESCRIPTION NEEDS TO BE AUTHORIZED BY HIS PROVIDER.     Kyle Blankenship Rep   08/16/23 11:10 EDT

## 2023-08-21 RX ORDER — GABAPENTIN 300 MG/1
300 CAPSULE ORAL 3 TIMES DAILY
Qty: 270 CAPSULE | Refills: 0 | OUTPATIENT
Start: 2023-08-21

## 2023-09-06 ENCOUNTER — PATIENT MESSAGE (OUTPATIENT)
Dept: FAMILY MEDICINE CLINIC | Facility: CLINIC | Age: 68
End: 2023-09-06

## 2023-09-06 ENCOUNTER — PATIENT MESSAGE (OUTPATIENT)
Dept: FAMILY MEDICINE CLINIC | Facility: CLINIC | Age: 68
End: 2023-09-06
Payer: MEDICARE

## 2023-09-06 ENCOUNTER — TELEMEDICINE (OUTPATIENT)
Dept: FAMILY MEDICINE CLINIC | Facility: CLINIC | Age: 68
End: 2023-09-06
Payer: MEDICARE

## 2023-09-06 ENCOUNTER — TELEPHONE (OUTPATIENT)
Dept: FAMILY MEDICINE CLINIC | Facility: CLINIC | Age: 68
End: 2023-09-06

## 2023-09-06 DIAGNOSIS — K13.70 ORAL LESION: Primary | ICD-10-CM

## 2023-09-06 DIAGNOSIS — B37.0 THRUSH: ICD-10-CM

## 2023-09-06 DIAGNOSIS — F41.1 GENERALIZED ANXIETY DISORDER: ICD-10-CM

## 2023-09-06 RX ORDER — TRAMADOL HYDROCHLORIDE 50 MG/1
50 TABLET ORAL EVERY 12 HOURS PRN
Qty: 60 TABLET | Refills: 2 | Status: SHIPPED | OUTPATIENT
Start: 2023-09-06

## 2023-09-06 RX ORDER — HYDROXYZINE HYDROCHLORIDE 25 MG/1
25 TABLET, FILM COATED ORAL 3 TIMES DAILY PRN
Qty: 60 TABLET | Refills: 1 | Status: SHIPPED | OUTPATIENT
Start: 2023-09-06

## 2023-09-06 RX ORDER — FLUCONAZOLE 150 MG/1
150 TABLET ORAL
Qty: 3 TABLET | Refills: 0 | Status: SHIPPED | OUTPATIENT
Start: 2023-09-06

## 2023-09-06 NOTE — TELEPHONE ENCOUNTER
His kidney function has been abnormal so we can't use the anti-inflammatory arthritis meds. He can take Tylenol and I can send in Tramadol which is a controlled substance pain med to use for severe pain. Thanks!

## 2023-09-06 NOTE — TELEPHONE ENCOUNTER
Caller: Fredy Palacios    Relationship: Self    Best call back number: [phone]     What medication are you requesting: MEDICATION FOR ARTHRITIS PAIN     What are your current symptoms: PAIN IN SHOULDERS, KNEES AND HIPS     How long have you been experiencing symptoms: MULTIPLE YEARS, GOTTEN WORSE RECENTLY     Have you had these symptoms before:    [x] Yes  [] No    Have you been treated for these symptoms before:   [] Yes  [x] No    If a prescription is needed, what is your preferred pharmacy and phone number: [address] - [phone] [facility] [phone] FX     Additional notes: PLEASE CALL PATIENT BACK, IF NO ANSWER LEAVE A DETAILED MESSAGE.

## 2023-09-06 NOTE — PROGRESS NOTES
Chief Complaint  personal issues, Thrush (In mouth), and Arthritis  This was a video and audio enabled telemedicine encounter. The patient was in home. The provider was in office.     Subjective          Fredy Palacios presents to [facility]  History of Present Illness  Pt has had increased anxiety in the past few weeks. He has had recurrent thrush in his mouth. He has had an oral lesion on his lower gum line. He dipped smokeless tobacco for years.   Arthritis  Pertinent negatives include no fatigue or fever.     Objective   Vital Signs:   There were no vitals taken for this visit.    There is no height or weight on file to calculate BMI.    Review of Systems   Constitutional:  Negative for fatigue and fever.   Respiratory:  Negative for shortness of breath.    Cardiovascular:  Negative for chest pain, palpitations and leg swelling.   Musculoskeletal:  Positive for arthritis.   Neurological:  Negative for syncope.   Psychiatric/Behavioral:  The patient is nervous/anxious.         Current Outpatient Medications:     bumetanide (BUMEX) 2 MG tablet, Take 1 tablet by mouth Daily., Disp: 90 tablet, Rfl: 1    fluconazole (Diflucan) 150 MG tablet, Take 1 tablet by mouth Every 3 (Three) Days., Disp: 3 tablet, Rfl: 0    FLUoxetine (PROzac) 40 MG capsule, Take 1 capsule by mouth Daily., Disp: 90 capsule, Rfl: 1    gabapentin (NEURONTIN) 300 MG capsule, Take 1 capsule by mouth 3 (Three) Times a Day., Disp: 270 capsule, Rfl: 0    montelukast (SINGULAIR) 10 MG tablet, Take 1 tablet by mouth Every Night., Disp: 90 tablet, Rfl: 3    omeprazole (priLOSEC) 40 MG capsule, Take 1 capsule by mouth Daily., Disp: 90 capsule, Rfl: 1    potassium chloride ER (K-TAB) 20 MEQ tablet controlled-release ER tablet, Take 1 tablet by mouth Daily., Disp: 90 tablet, Rfl: 1    traZODone (DESYREL) 50 MG tablet, 1-2 tablets HS, Disp: 180 tablet, Rfl: 1    hydrOXYzine (ATARAX) 25 MG tablet, Take 1 tablet by mouth 3  (Three) Times a Day As Needed for Anxiety., Disp: 60 tablet, Rfl: 1      Allergies: Azithromycin, Bactrim [sulfamethoxazole-trimethoprim], Cephalexin, Levofloxacin, and Penicillins    Physical Exam  Constitutional:       Appearance: Normal appearance.   Neurological:      Mental Status: He is alert.   Psychiatric:         Mood and Affect: Mood normal.         Behavior: Behavior normal.         Thought Content: Thought content normal.         Judgment: Judgment normal.        Result Review :                   Assessment and Plan    Diagnoses and all orders for this visit:    1. Oral lesion (Primary)  Comments:  F/U with oral surgeon.  Orders:  -     Ambulatory Referral to Oral Maxillofacial Surgery    2. Thrush  Comments:  Diflucan as directed. F/U with oral specialist.  Orders:  -     fluconazole (Diflucan) 150 MG tablet; Take 1 tablet by mouth Every 3 (Three) Days.  Dispense: 3 tablet; Refill: 0    3. Generalized anxiety disorder  Comments:  Continue Prozac and add Hydroxyzine as needed. RTC for worsened sx and if not improving in 1 month.  Orders:  -     hydrOXYzine (ATARAX) 25 MG tablet; Take 1 tablet by mouth 3 (Three) Times a Day As Needed for Anxiety.  Dispense: 60 tablet; Refill: 1                Follow Up   Return in about 1 month (around 10/6/2023) for if not improving or sooner if symptoms worsen.  Patient was given instructions and counseling regarding his condition or for health maintenance advice. Please see specific information pulled into the AVS if appropriate.     RADHA Baig

## 2023-09-12 NOTE — TELEPHONE ENCOUNTER
Called pt back and he is ok but still having pain. He says he will ask about it at his wife's appt this week.

## 2023-10-02 DIAGNOSIS — F41.1 GENERALIZED ANXIETY DISORDER: ICD-10-CM

## 2023-10-02 DIAGNOSIS — K21.9 GASTROESOPHAGEAL REFLUX DISEASE WITHOUT ESOPHAGITIS: ICD-10-CM

## 2023-10-03 RX ORDER — OMEPRAZOLE 40 MG/1
CAPSULE, DELAYED RELEASE ORAL
Qty: 90 CAPSULE | Refills: 1 | Status: SHIPPED | OUTPATIENT
Start: 2023-10-03

## 2023-10-03 RX ORDER — FLUOXETINE HYDROCHLORIDE 40 MG/1
CAPSULE ORAL
Qty: 90 CAPSULE | Refills: 1 | Status: SHIPPED | OUTPATIENT
Start: 2023-10-03

## 2023-10-03 RX ORDER — GABAPENTIN 300 MG/1
CAPSULE ORAL
Qty: 270 CAPSULE | OUTPATIENT
Start: 2023-10-03

## 2023-11-27 DIAGNOSIS — F51.01 PRIMARY INSOMNIA: ICD-10-CM

## 2023-11-27 DIAGNOSIS — R60.9 PERIPHERAL EDEMA: ICD-10-CM

## 2023-11-27 DIAGNOSIS — I10 PRIMARY HYPERTENSION: ICD-10-CM

## 2023-11-27 RX ORDER — TRAZODONE HYDROCHLORIDE 50 MG/1
TABLET ORAL
Qty: 180 TABLET | Refills: 0 | Status: SHIPPED | OUTPATIENT
Start: 2023-11-27

## 2023-11-27 RX ORDER — POTASSIUM CHLORIDE 20 MEQ/1
20 TABLET, EXTENDED RELEASE ORAL DAILY
Qty: 90 TABLET | Refills: 0 | Status: SHIPPED | OUTPATIENT
Start: 2023-11-27

## 2023-11-27 RX ORDER — BUMETANIDE 2 MG/1
2 TABLET ORAL DAILY
Qty: 90 TABLET | Refills: 0 | Status: SHIPPED | OUTPATIENT
Start: 2023-11-27

## 2023-12-04 ENCOUNTER — TELEPHONE (OUTPATIENT)
Dept: FAMILY MEDICINE CLINIC | Facility: CLINIC | Age: 68
End: 2023-12-04
Payer: MEDICARE

## 2023-12-04 NOTE — TELEPHONE ENCOUNTER
We dont have a PA request. Looks like he's due for a refill.     Needs appointment every 3 months for controlled substance. Please schedule.

## 2023-12-04 NOTE — TELEPHONE ENCOUNTER
Caller: Fredy Palacios    Relationship: Self    Best call back number: [phone]    What test/procedure/medication requested: traMADol (ULTRAM) 50 MG tablet     When is it needed: ASAP    Where is the test/procedure going to be performed: [address] [phone] [facility] [phone]       Additional information or concerns: PATIENT CALLED STATING HIS PHARMACY INFORMED HIM THAT A PRIOR AUTHORIZATION WAS NEEDED FOR TRAMADOL. PLEASE ADVISE

## 2024-01-03 ENCOUNTER — OFFICE VISIT (OUTPATIENT)
Dept: FAMILY MEDICINE CLINIC | Facility: CLINIC | Age: 69
End: 2024-01-03
Payer: MEDICARE

## 2024-01-03 VITALS
HEART RATE: 80 BPM | SYSTOLIC BLOOD PRESSURE: 102 MMHG | WEIGHT: 313 LBS | OXYGEN SATURATION: 96 % | HEIGHT: 71 IN | BODY MASS INDEX: 43.82 KG/M2 | DIASTOLIC BLOOD PRESSURE: 60 MMHG

## 2024-01-03 DIAGNOSIS — K21.9 GASTROESOPHAGEAL REFLUX DISEASE WITHOUT ESOPHAGITIS: ICD-10-CM

## 2024-01-03 DIAGNOSIS — Z00.00 ENCOUNTER FOR ANNUAL WELLNESS EXAM IN MEDICARE PATIENT: Primary | ICD-10-CM

## 2024-01-03 DIAGNOSIS — E78.2 MIXED HYPERLIPIDEMIA: ICD-10-CM

## 2024-01-03 DIAGNOSIS — R73.9 HYPERGLYCEMIA: ICD-10-CM

## 2024-01-03 DIAGNOSIS — R60.9 PERIPHERAL EDEMA: ICD-10-CM

## 2024-01-03 DIAGNOSIS — M54.50 LUMBAR PAIN: ICD-10-CM

## 2024-01-03 DIAGNOSIS — F51.01 PRIMARY INSOMNIA: ICD-10-CM

## 2024-01-03 DIAGNOSIS — F41.1 GENERALIZED ANXIETY DISORDER: ICD-10-CM

## 2024-01-03 DIAGNOSIS — I10 PRIMARY HYPERTENSION: ICD-10-CM

## 2024-01-03 RX ORDER — POTASSIUM CHLORIDE 20 MEQ/1
20 TABLET, EXTENDED RELEASE ORAL DAILY
Qty: 90 TABLET | Refills: 1 | Status: SHIPPED | OUTPATIENT
Start: 2024-01-03

## 2024-01-03 RX ORDER — BUMETANIDE 2 MG/1
2 TABLET ORAL DAILY
Qty: 90 TABLET | Refills: 1 | Status: SHIPPED | OUTPATIENT
Start: 2024-01-03

## 2024-01-03 RX ORDER — GABAPENTIN 300 MG/1
300 CAPSULE ORAL 3 TIMES DAILY
Qty: 270 CAPSULE | Refills: 0 | Status: SHIPPED | OUTPATIENT
Start: 2024-01-03

## 2024-01-03 RX ORDER — BUSPIRONE HYDROCHLORIDE 5 MG/1
5 TABLET ORAL 2 TIMES DAILY
Qty: 180 TABLET | Refills: 1 | Status: SHIPPED | OUTPATIENT
Start: 2024-01-03

## 2024-01-03 RX ORDER — FLUOXETINE HYDROCHLORIDE 20 MG/1
60 CAPSULE ORAL DAILY
Qty: 270 CAPSULE | Refills: 1 | Status: SHIPPED | OUTPATIENT
Start: 2024-01-03

## 2024-01-03 RX ORDER — OMEPRAZOLE 40 MG/1
40 CAPSULE, DELAYED RELEASE ORAL DAILY
Qty: 90 CAPSULE | Refills: 1 | Status: SHIPPED | OUTPATIENT
Start: 2024-01-03

## 2024-01-03 RX ORDER — TRAZODONE HYDROCHLORIDE 50 MG/1
TABLET ORAL
Qty: 180 TABLET | Refills: 1 | Status: SHIPPED | OUTPATIENT
Start: 2024-01-03

## 2024-01-03 NOTE — PROGRESS NOTES
The ABCs of the Annual Wellness Visit  Subsequent Medicare Wellness Visit    Subjective      Fredy Palacios is a 68 y.o. male who presents for a Subsequent Medicare Wellness Visit.    The following portions of the patient's history were reviewed and   updated as appropriate: past family history, past social history, and past surgical history.    Compared to one year ago, the patient feels his physical   health is the same.    Compared to one year ago, the patient feels his mental   health is the same.    Recent Hospitalizations:  He was not admitted to the hospital during the last year.       Current Medical Providers:  Patient Care Team:  Lakeisha Lynne APRN as PCP - General (Nurse Practitioner)    Outpatient Medications Prior to Visit   Medication Sig Dispense Refill    montelukast (SINGULAIR) 10 MG tablet Take 1 tablet by mouth Every Night. 90 tablet 3    bumetanide (BUMEX) 2 MG tablet TAKE 1 TABLET EVERY DAY 90 tablet 0    FLUoxetine (PROzac) 40 MG capsule TAKE 1 CAPSULE EVERY DAY 90 capsule 1    gabapentin (NEURONTIN) 300 MG capsule Take 1 capsule by mouth 3 (Three) Times a Day. 270 capsule 0    omeprazole (priLOSEC) 40 MG capsule TAKE 1 CAPSULE EVERY DAY 90 capsule 1    potassium chloride (K-DUR,KLOR-CON) 20 MEQ CR tablet TAKE 1 TABLET EVERY DAY 90 tablet 0    traZODone (DESYREL) 50 MG tablet TAKE 1 TO 2 TABLETS AT BEDTIME 180 tablet 0    fluconazole (Diflucan) 150 MG tablet Take 1 tablet by mouth Every 3 (Three) Days. 3 tablet 0    hydrOXYzine (ATARAX) 25 MG tablet Take 1 tablet by mouth 3 (Three) Times a Day As Needed for Anxiety. 60 tablet 1    potassium chloride ER (K-TAB) 20 MEQ tablet controlled-release ER tablet Take 1 tablet by mouth Daily. 90 tablet 1    traMADol (ULTRAM) 50 MG tablet Take 1 tablet by mouth Every 12 (Twelve) Hours As Needed for Moderate Pain. 60 tablet 2     No facility-administered medications prior to visit.       No opioid medication identified on active medication list. I  "have reviewed chart for other potential  high risk medication/s and harmful drug interactions in the elderly.        Aspirin is not on active medication list.  Aspirin use is not indicated based on review of current medical condition/s. Risk of harm outweighs potential benefits.  .    Patient Active Problem List   Diagnosis    Anxiety    Benign hypertension    Chronic obstructive lung disease    Dyspnea on exertion    Joint pain    Leg cramps    Morbid obesity    Peripheral edema    Affective psychosis    Body mass index (BMI) 40.0-44.9, adult    Mild recurrent major depression    Tobacco user     Advance Care Planning  Advance Directive is not on file.  ACP discussion was held with the patient during this visit. Patient does not have an advance directive, information provided.     Objective    Vitals:    01/03/24 1437   BP: 102/60   Pulse: 80   SpO2: 96%   Weight: (!) 142 kg (313 lb)   Height: 180.3 cm (71\")     Estimated body mass index is 43.65 kg/m² as calculated from the following:    Height as of this encounter: 180.3 cm (71\").    Weight as of this encounter: 142 kg (313 lb).           Does the patient have evidence of cognitive impairment?   No            Review of Systems   Constitutional:  Negative for appetite change, fatigue and unexpected weight loss.   Respiratory:  Negative for chest tightness and shortness of breath.    Cardiovascular:  Negative for chest pain, palpitations and leg swelling.   Neurological:  Negative for syncope, weakness and confusion.   Psychiatric/Behavioral:  The patient is nervous/anxious.         Physical Exam  Constitutional:       Appearance: Normal appearance.   HENT:      Head: Normocephalic.   Eyes:      Conjunctiva/sclera: Conjunctivae normal.      Pupils: Pupils are equal, round, and reactive to light.   Cardiovascular:      Rate and Rhythm: Normal rate and regular rhythm.      Heart sounds: Normal heart sounds.   Pulmonary:      Effort: Pulmonary effort is normal.      " Breath sounds: Normal breath sounds.   Abdominal:      Tenderness: There is no abdominal tenderness.   Musculoskeletal:         General: Normal range of motion.   Skin:     General: Skin is warm and dry.      Capillary Refill: Capillary refill takes less than 2 seconds.   Neurological:      General: No focal deficit present.      Mental Status: He is alert and oriented to person, place, and time.   Psychiatric:         Mood and Affect: Mood normal.         Behavior: Behavior normal.         Thought Content: Thought content normal.         Judgment: Judgment normal.          HEALTH RISK ASSESSMENT    Smoking Status:  Social History     Tobacco Use   Smoking Status Former    Types: Cigars    Quit date:     Years since quittin.0    Passive exposure: Never   Smokeless Tobacco Former    Types: Chew    Quit date: 2023   Tobacco Comments    Chewed tobacco and dip quit about a year ago     Alcohol Consumption:  Social History     Substance and Sexual Activity   Alcohol Use Not Currently     Fall Risk Screen:    ANIVAL Fall Risk Assessment was completed, and patient is at LOW risk for falls.Assessment completed on:1/3/2024    Depression Screenin/3/2024     2:48 PM   PHQ-2/PHQ-9 Depression Screening   Little Interest or Pleasure in Doing Things 0-->not at all   Feeling Down, Depressed or Hopeless 0-->not at all   PHQ-9: Brief Depression Severity Measure Score 0       Health Habits and Functional and Cognitive Screenin/3/2024     2:00 PM   Functional & Cognitive Status   Do you have difficulty preparing food and eating? No   Do you have difficulty bathing yourself, getting dressed or grooming yourself? No   Do you have difficulty using the toilet? No   Do you have difficulty moving around from place to place? No   Do you have trouble with steps or getting out of a bed or a chair? No   Current Diet Unhealthy Diet   Exercise (times per week) 0 times per week   Do you need help using the phone?  No    Are you deaf or do you have serious difficulty hearing?  No   Do you need help to go to places out of walking distance? No   Do you need help shopping? No   Do you need help preparing meals?  No   Do you need help with housework?  No   Do you need help with laundry? No   Do you need help taking your medications? No   Do you need help managing money? No   Do you ever drive or ride in a car without wearing a seat belt? No   Have you felt unusual stress, anger or loneliness in the last month? No   Who do you live with? Spouse   If you need help, do you have trouble finding someone available to you? No   Have you been bothered in the last four weeks by sexual problems? No   Do you have difficulty concentrating, remembering or making decisions? No       Age-appropriate Screening Schedule:  Refer to the list below for future screening recommendations based on patient's age, sex and/or medical conditions. Orders for these recommended tests are listed in the plan section. The patient has been provided with a written plan.    Health Maintenance   Topic Date Due    Pneumococcal Vaccine 65+ (2 of 2 - PCV) 10/23/2018    HEPATITIS C SCREENING  Never done    AAA SCREEN (ONE-TIME)  Never done    TDAP/TD VACCINES (2 - Td or Tdap) 11/30/2022    ZOSTER VACCINE (2 of 3) 01/03/2024 (Originally 11/14/2017)    LIPID PANEL  06/28/2024    BMI FOLLOWUP  07/13/2024    ANNUAL WELLNESS VISIT  01/03/2025    COLORECTAL CANCER SCREENING  05/03/2026    COVID-19 Vaccine  Completed    INFLUENZA VACCINE  Completed                CMS Preventative Services Quick Reference  Risk Factors Identified During Encounter:    None Identified    The above risks/problems have been discussed with the patient.  Pertinent information has been shared with the patient in the After Visit Summary.    Diagnoses and all orders for this visit:    1. Encounter for annual wellness exam in Medicare patient (Primary)  Comments:  We discussed diet, exercise, vaccines, and  prev counseling. Return for labs.    2. Primary hypertension  Comments:  Continue current medications. We discussed diet and exercise. Labs drawn.   Orders:  -     bumetanide (BUMEX) 2 MG tablet; Take 1 tablet by mouth Daily.  Dispense: 90 tablet; Refill: 1    3. Peripheral edema  Comments:  Continue Bumex.  Orders:  -     bumetanide (BUMEX) 2 MG tablet; Take 1 tablet by mouth Daily.  Dispense: 90 tablet; Refill: 1  -     potassium chloride (K-DUR,KLOR-CON) 20 MEQ CR tablet; Take 1 tablet by mouth Daily.  Dispense: 90 tablet; Refill: 1    4. Lumbar pain  Comments:  Continue Gabapentin. RTC for follow up in 3 months.   Orders:  -     gabapentin (NEURONTIN) 300 MG capsule; Take 1 capsule by mouth 3 (Three) Times a Day.  Dispense: 270 capsule; Refill: 0    5. Gastroesophageal reflux disease without esophagitis  Comments:  Continue Omeprazole.  Orders:  -     omeprazole (priLOSEC) 40 MG capsule; Take 1 capsule by mouth Daily.  Dispense: 90 capsule; Refill: 1    6. Peripheral edema  Comments:  Continue Bumex. Labs drawn. RTC for worsened sx and if not improving in 2 weeks.   Orders:  -     bumetanide (BUMEX) 2 MG tablet; Take 1 tablet by mouth Daily.  Dispense: 90 tablet; Refill: 1  -     potassium chloride (K-DUR,KLOR-CON) 20 MEQ CR tablet; Take 1 tablet by mouth Daily.  Dispense: 90 tablet; Refill: 1    7. Primary insomnia  Comments:  Continue trazodone.  Orders:  -     traZODone (DESYREL) 50 MG tablet; TAKE 1 TO 2 TABLETS AT BEDTIME  Dispense: 180 tablet; Refill: 1    8. Generalized anxiety disorder  Comments:  Raise dose of Prozac and add Buspar. RTC in 1 month if not improving in 1 month.  Orders:  -     FLUoxetine (PROzac) 20 MG capsule; Take 3 capsules by mouth Daily.  Dispense: 270 capsule; Refill: 1  -     busPIRone (BUSPAR) 5 MG tablet; Take 1 tablet by mouth 2 (Two) Times a Day.  Dispense: 180 tablet; Refill: 1    9. Mixed hyperlipidemia  -     CBC & Differential  -     Comprehensive Metabolic Panel  -      Lipid Panel    10. Hyperglycemia  -     Hemoglobin A1c        Follow Up:   Next Medicare Wellness visit to be scheduled in 1 year.      An After Visit Summary and PPPS were made available to the patient.

## 2024-01-30 ENCOUNTER — TELEPHONE (OUTPATIENT)
Dept: FAMILY MEDICINE CLINIC | Facility: CLINIC | Age: 69
End: 2024-01-30
Payer: MEDICARE

## 2024-01-30 NOTE — TELEPHONE ENCOUNTER
Caller: Fredy Palacios    Relationship: Self    Best call back number: [phone]     Requested Prescriptions: TRAMADOL 50 MG        Pharmacy where request should be sent: [address] [phone] [facility] [phone] FX     Last office visit with prescribing clinician: 1/3/2024   Last telemedicine visit with prescribing clinician: 9/6/2023   Next office visit with prescribing clinician: Visit date not found     Additional details provided by patient: PATIENT IS OUT OF MEDICATION.    Does the patient have less than a 3 day supply:  [x] Yes  [] No    Would you like a call back once the refill request has been completed: [] Yes [x] No    If the office needs to give you a call back, can they leave a voicemail: [] Yes [x] No    Kyle Hazel Rep   01/30/24 13:53 EST

## 2024-01-31 RX ORDER — TRAMADOL HYDROCHLORIDE 50 MG/1
50 TABLET ORAL EVERY 12 HOURS PRN
Qty: 60 TABLET | Refills: 2 | Status: SHIPPED | OUTPATIENT
Start: 2024-01-31

## 2024-03-25 RX ORDER — TRAMADOL HYDROCHLORIDE 50 MG/1
50 TABLET ORAL EVERY 12 HOURS PRN
Qty: 60 TABLET | Refills: 0 | Status: SHIPPED | OUTPATIENT
Start: 2024-03-25

## 2024-03-25 NOTE — TELEPHONE ENCOUNTER
Let's get him scheduled for [facility]. Ok to schedule him on my schedule. He can do virtual if he'd like. Thanks!

## 2024-04-01 ENCOUNTER — TELEMEDICINE (OUTPATIENT)
Dept: FAMILY MEDICINE CLINIC | Facility: CLINIC | Age: 69
End: 2024-04-01
Payer: MEDICARE

## 2024-04-01 DIAGNOSIS — R42 VERTIGO: Primary | ICD-10-CM

## 2024-04-01 DIAGNOSIS — M54.50 LUMBAR PAIN: ICD-10-CM

## 2024-04-01 PROCEDURE — 99214 OFFICE O/P EST MOD 30 MIN: CPT | Performed by: NURSE PRACTITIONER

## 2024-04-01 RX ORDER — MECLIZINE HYDROCHLORIDE 25 MG/1
25 TABLET ORAL 3 TIMES DAILY PRN
Qty: 30 TABLET | Refills: 0 | Status: SHIPPED | OUTPATIENT
Start: 2024-04-01

## 2024-04-01 RX ORDER — GABAPENTIN 300 MG/1
300 CAPSULE ORAL 3 TIMES DAILY
Qty: 270 CAPSULE | Refills: 0 | Status: SHIPPED | OUTPATIENT
Start: 2024-04-01

## 2024-04-01 RX ORDER — HYDROCODONE BITARTRATE AND ACETAMINOPHEN 5; 325 MG/1; MG/1
1 TABLET ORAL EVERY 8 HOURS PRN
Qty: 12 TABLET | Refills: 0 | Status: SHIPPED | OUTPATIENT
Start: 2024-04-01

## 2024-04-01 NOTE — PROGRESS NOTES
Chief Complaint  Med Refill  This was a video and audio enabled telemedicine encounter. The patient was in home. The provider was in office.     Subjective          Fredy Palacios presents to [facility]  History of Present Illness  Pt is here to follow up on neuropathy. He is taking Gabapentin and doing well. He fell on Friday and has had joint pain and back pain since then. He has intermittent vertigo and has been dizzy since then. He denies LOC.      Objective   Vital Signs:   There were no vitals taken for this visit.    There is no height or weight on file to calculate BMI.    Review of Systems   Musculoskeletal:  Positive for arthralgias.          Current Outpatient Medications:     bumetanide (BUMEX) 2 MG tablet, Take 1 tablet by mouth Daily., Disp: 90 tablet, Rfl: 1    busPIRone (BUSPAR) 5 MG tablet, Take 1 tablet by mouth 2 (Two) Times a Day., Disp: 180 tablet, Rfl: 1    FLUoxetine (PROzac) 20 MG capsule, Take 3 capsules by mouth Daily., Disp: 270 capsule, Rfl: 1    gabapentin (NEURONTIN) 300 MG capsule, Take 1 capsule by mouth 3 (Three) Times a Day., Disp: 270 capsule, Rfl: 0    montelukast (SINGULAIR) 10 MG tablet, Take 1 tablet by mouth Every Night., Disp: 90 tablet, Rfl: 3    omeprazole (priLOSEC) 40 MG capsule, Take 1 capsule by mouth Daily., Disp: 90 capsule, Rfl: 1    potassium chloride (K-DUR,KLOR-CON) 20 MEQ CR tablet, Take 1 tablet by mouth Daily., Disp: 90 tablet, Rfl: 1    traMADol (ULTRAM) 50 MG tablet, TAKE 1 TABLET BY MOUTH EVERY 12 HOURS AS NEEDED FOR MODERATE PAIN., Disp: 60 tablet, Rfl: 0    traZODone (DESYREL) 50 MG tablet, TAKE 1 TO 2 TABLETS AT BEDTIME, Disp: 180 tablet, Rfl: 1    HYDROcodone-acetaminophen (NORCO) 5-325 MG per tablet, Take 1 tablet by mouth Every 8 (Eight) Hours As Needed for Severe Pain., Disp: 12 tablet, Rfl: 0    meclizine (ANTIVERT) 25 MG tablet, Take 1 tablet by mouth 3 (Three) Times a Day As Needed for Dizziness., Disp: 30 tablet,  Rfl: 0      Allergies: Azithromycin, Bactrim [sulfamethoxazole-trimethoprim], Cephalexin, Levofloxacin, and Penicillins    Physical Exam  Constitutional:       Appearance: Normal appearance.   Neurological:      Mental Status: He is alert.   Psychiatric:         Mood and Affect: Mood normal.         Behavior: Behavior normal.         Thought Content: Thought content normal.         Judgment: Judgment normal.          Result Review :                   Assessment and Plan    Diagnoses and all orders for this visit:    1. Vertigo (Primary)  Comments:  Meclizine PRN. RTC if not improving in 1 week.  Orders:  -     meclizine (ANTIVERT) 25 MG tablet; Take 1 tablet by mouth 3 (Three) Times a Day As Needed for Dizziness.  Dispense: 30 tablet; Refill: 0    2. Lumbar pain  Comments:  Continue Gabapentin. RTC for follow up in 3 months. Norco for severe pain PRN.  Orders:  -     gabapentin (NEURONTIN) 300 MG capsule; Take 1 capsule by mouth 3 (Three) Times a Day.  Dispense: 270 capsule; Refill: 0  -     HYDROcodone-acetaminophen (NORCO) 5-325 MG per tablet; Take 1 tablet by mouth Every 8 (Eight) Hours As Needed for Severe Pain.  Dispense: 12 tablet; Refill: 0                Follow Up   No follow-ups on file.  Patient was given instructions and counseling regarding his condition or for health maintenance advice. Please see specific information pulled into the AVS if appropriate.     RADHA Baig

## 2024-05-01 ENCOUNTER — OFFICE VISIT (OUTPATIENT)
Dept: FAMILY MEDICINE CLINIC | Facility: CLINIC | Age: 69
End: 2024-05-01
Payer: MEDICARE

## 2024-05-01 VITALS
OXYGEN SATURATION: 97 % | BODY MASS INDEX: 41.72 KG/M2 | HEIGHT: 71 IN | HEART RATE: 80 BPM | SYSTOLIC BLOOD PRESSURE: 110 MMHG | WEIGHT: 298 LBS | DIASTOLIC BLOOD PRESSURE: 72 MMHG

## 2024-05-01 DIAGNOSIS — N50.82 SCROTAL PAIN: ICD-10-CM

## 2024-05-01 DIAGNOSIS — F41.1 GENERALIZED ANXIETY DISORDER: ICD-10-CM

## 2024-05-01 DIAGNOSIS — M54.50 LUMBAR PAIN: ICD-10-CM

## 2024-05-01 DIAGNOSIS — R47.1 DYSARTHRIA: ICD-10-CM

## 2024-05-01 DIAGNOSIS — M79.642 PAIN OF LEFT HAND: Primary | ICD-10-CM

## 2024-05-01 DIAGNOSIS — M25.532 WRIST PAIN, LEFT: ICD-10-CM

## 2024-05-01 PROCEDURE — 1160F RVW MEDS BY RX/DR IN RCRD: CPT | Performed by: NURSE PRACTITIONER

## 2024-05-01 PROCEDURE — 3074F SYST BP LT 130 MM HG: CPT | Performed by: NURSE PRACTITIONER

## 2024-05-01 PROCEDURE — 3078F DIAST BP <80 MM HG: CPT | Performed by: NURSE PRACTITIONER

## 2024-05-01 PROCEDURE — 1159F MED LIST DOCD IN RCRD: CPT | Performed by: NURSE PRACTITIONER

## 2024-05-01 PROCEDURE — 99214 OFFICE O/P EST MOD 30 MIN: CPT | Performed by: NURSE PRACTITIONER

## 2024-05-01 RX ORDER — BUSPIRONE HYDROCHLORIDE 10 MG/1
10 TABLET ORAL 2 TIMES DAILY
Qty: 180 TABLET | Refills: 1 | Status: SHIPPED | OUTPATIENT
Start: 2024-05-01

## 2024-05-01 RX ORDER — TRAMADOL HYDROCHLORIDE 50 MG/1
50 TABLET ORAL EVERY 12 HOURS PRN
Qty: 60 TABLET | Refills: 2 | Status: SHIPPED | OUTPATIENT
Start: 2024-05-01

## 2024-05-01 RX ORDER — GABAPENTIN 300 MG/1
300 CAPSULE ORAL 3 TIMES DAILY
Qty: 270 CAPSULE | Refills: 0 | Status: SHIPPED | OUTPATIENT
Start: 2024-05-01

## 2024-05-01 NOTE — PROGRESS NOTES
"Chief Complaint  Wrist Pain (Left wrist pain after fall couple weeks ago) and Testicle Pain (X 1 week)    Subjective          Fredy Palacios presents to [facility]  History of Present Illness  Pt fell 2 weeks ago and has had left wrist tenderness since then. He has had left sided testicle pain for the past week. He injured his testicle as a child. He has had increased anxiety at times. He feels that he has difficulty with word finding at times. He has a family h/o dementia. He needs a refill on his Tramadol and Gabapentin.       Objective   Vital Signs:   /72   Pulse 80   Ht 180.3 cm (71\")   Wt 135 kg (298 lb)   SpO2 97%   BMI 41.56 kg/m²     Body mass index is 41.56 kg/m².    Review of Systems   Constitutional:  Negative for fatigue and fever.   Respiratory:  Negative for shortness of breath.    Cardiovascular:  Negative for chest pain, palpitations and leg swelling.   Genitourinary:  Positive for testicular pain.   Musculoskeletal:  Positive for arthralgias.   Neurological:  Positive for speech difficulty. Negative for syncope.   Psychiatric/Behavioral:  The patient is nervous/anxious.           Current Outpatient Medications:     busPIRone (BUSPAR) 10 MG tablet, Take 1 tablet by mouth 2 (Two) Times a Day., Disp: 180 tablet, Rfl: 1    gabapentin (NEURONTIN) 300 MG capsule, Take 1 capsule by mouth 3 (Three) Times a Day., Disp: 270 capsule, Rfl: 0    traMADol (ULTRAM) 50 MG tablet, Take 1 tablet by mouth Every 12 (Twelve) Hours As Needed for Moderate Pain., Disp: 60 tablet, Rfl: 2    bumetanide (BUMEX) 2 MG tablet, Take 1 tablet by mouth Daily., Disp: 90 tablet, Rfl: 1    FLUoxetine (PROzac) 20 MG capsule, Take 3 capsules by mouth Daily., Disp: 270 capsule, Rfl: 1    HYDROcodone-acetaminophen (NORCO) 5-325 MG per tablet, Take 1 tablet by mouth Every 8 (Eight) Hours As Needed for Severe Pain., Disp: 12 tablet, Rfl: 0    meclizine (ANTIVERT) 25 MG tablet, Take 1 tablet by " mouth 3 (Three) Times a Day As Needed for Dizziness., Disp: 30 tablet, Rfl: 0    montelukast (SINGULAIR) 10 MG tablet, Take 1 tablet by mouth Every Night., Disp: 90 tablet, Rfl: 3    omeprazole (priLOSEC) 40 MG capsule, Take 1 capsule by mouth Daily., Disp: 90 capsule, Rfl: 1    potassium chloride (K-DUR,KLOR-CON) 20 MEQ CR tablet, Take 1 tablet by mouth Daily., Disp: 90 tablet, Rfl: 1    traZODone (DESYREL) 50 MG tablet, TAKE 1 TO 2 TABLETS AT BEDTIME, Disp: 180 tablet, Rfl: 1      Allergies: Azithromycin, Bactrim [sulfamethoxazole-trimethoprim], Cephalexin, Levofloxacin, and Penicillins    Physical Exam  Constitutional:       Appearance: Normal appearance.   HENT:      Head: Normocephalic.   Eyes:      Conjunctiva/sclera: Conjunctivae normal.      Pupils: Pupils are equal, round, and reactive to light.   Cardiovascular:      Rate and Rhythm: Normal rate and regular rhythm.      Heart sounds: Normal heart sounds.   Pulmonary:      Effort: Pulmonary effort is normal.      Breath sounds: Normal breath sounds.   Abdominal:      Tenderness: There is no abdominal tenderness.   Musculoskeletal:         General: Normal range of motion.      Comments: Tenderness left wrist and base of thumb   Skin:     General: Skin is warm and dry.      Capillary Refill: Capillary refill takes less than 2 seconds.   Neurological:      General: No focal deficit present.      Mental Status: He is alert and oriented to person, place, and time.   Psychiatric:         Mood and Affect: Mood normal.         Behavior: Behavior normal.         Thought Content: Thought content normal.         Judgment: Judgment normal.          Result Review :                   Assessment and Plan    Diagnoses and all orders for this visit:    1. Pain of left hand (Primary)  Comments:  XRs done. Apply ice to painful areas.  Orders:  -     XR Hand 3+ View Left; Future    2. Wrist pain, left  Comments:  XRs done. Apply ice to painful areas.  Orders:  -     XR Wrist  3+ View Left; Future    3. Scrotal pain  Comments:  US ordered.  Orders:  -     US Scrotum & Testicles; Future    4. Lumbar pain  Comments:  Continue Gabapentin. RTC for follow up in 3 months. Tramadol PRN.  Orders:  -     traMADol (ULTRAM) 50 MG tablet; Take 1 tablet by mouth Every 12 (Twelve) Hours As Needed for Moderate Pain.  Dispense: 60 tablet; Refill: 2  -     gabapentin (NEURONTIN) 300 MG capsule; Take 1 capsule by mouth 3 (Three) Times a Day.  Dispense: 270 capsule; Refill: 0    5. Generalized anxiety disorder  Comments:  Raise dose of Buspar. RTC in 1 month if not improving.  Orders:  -     busPIRone (BUSPAR) 10 MG tablet; Take 1 tablet by mouth 2 (Two) Times a Day.  Dispense: 180 tablet; Refill: 1    6. Dysarthria  Comments:  F/U with neurology.  Orders:  -     Ambulatory Referral to Neurology                Follow Up   Return in about 3 months (around 8/1/2024) for Recheck.  Patient was given instructions and counseling regarding his condition or for health maintenance advice. Please see specific information pulled into the AVS if appropriate.     RADHA Baig

## 2024-05-02 ENCOUNTER — TELEPHONE (OUTPATIENT)
Dept: FAMILY MEDICINE CLINIC | Facility: CLINIC | Age: 69
End: 2024-05-02
Payer: MEDICARE

## 2024-05-02 NOTE — TELEPHONE ENCOUNTER
Caller: Fredy Palacios    Relationship: Self    Best call back number: [phone]    Caller requesting test results: YES    What test was performed: XRAY    When was the test performed: 5/1    Where was the test performed: OFFICE

## 2024-05-03 NOTE — TELEPHONE ENCOUNTER
Name: Fredy Palacios    Relationship: Self    Best Callback Number: [phone]    HUB PROVIDED THE RELAY MESSAGE FROM THE OFFICE   PATIENT HAS FURTHER QUESTIONS AND WOULD LIKE A CALL BACK AT THE FOLLOWING PHONE NUMBER      ADDITIONAL INFORMATION: STATE THAT HE DOES NOT UNDER STAND WHAT IS GONG ON BECAUSE HE RECEIVED MESSAGE ON [place] [facility] STATING NOTHING WAS BROKEN FROM XRAY RESULTS

## 2024-05-06 ENCOUNTER — TELEPHONE (OUTPATIENT)
Dept: FAMILY MEDICINE CLINIC | Facility: CLINIC | Age: 69
End: 2024-05-06

## 2024-05-06 NOTE — TELEPHONE ENCOUNTER
Caller: PalaciosFredy    Relationship: Self    Best call back number: [phone]     What medication are you requesting: ALTERNATIVE TO: meclizine (ANTIVERT) 25 MG tablet     What are your current symptoms: DIZZINESS     How long have you been experiencing symptoms: SINCE LAST WEEK     Have you had these symptoms before:    [x] Yes  [] No    Have you been treated for these symptoms before:   [x] Yes  [] No    If a prescription is needed, what is your preferred pharmacy and phone number: [facility] [address] [place] [address] - [phone] [facility] [phone] FX     Additional notes: PATIENT STATES HE WAS GIVEN A PRESCRIPTION FOR DIZZINESS PREVIOUSLY WHICH WORKED. HE STATES THE DIZZINESS HAS COME BACK AND THE PRESCRIPTION IS NO LONGER WORKING.       meclizine (ANTIVERT) 25 MG tablet     PLEASE CALL PATIENT BACK, IF NO ANSWER LEAVE A DETAILED MESSAGE.

## 2024-05-06 NOTE — TELEPHONE ENCOUNTER
The Xrs showed severe arthritis in his hand. They did not see a fracture. We can refer him to ortho to let them take a look. Maybe they could give him an injection in his wrist. Is Lacy ok for this? Thanks!

## 2024-05-07 NOTE — TELEPHONE ENCOUNTER
Caller: Fredy Palacios    Relationship: Self    Best call back number: [phone]      Do you know the name of the person who called: PATIENT CALLED STATING THAT HE IS HAVING DIZZY SPELLS.  PATIENT HAS HAD THIS SYMPTOMS FOR OVER 3 WEEKS.  PATIENT WOULD LIKE SOMETHING TO TREAT THIS DIZZINESS.     [facility] - [facility] [address] [place] [address] - [phone] [facility] [phone] FX

## 2024-05-13 ENCOUNTER — TELEPHONE (OUTPATIENT)
Dept: FAMILY MEDICINE CLINIC | Facility: CLINIC | Age: 69
End: 2024-05-13
Payer: MEDICARE

## 2024-05-13 NOTE — TELEPHONE ENCOUNTER
Hub to relay   If the swelling and pain is acutely worse, I'd say let's go to the ER so they can do the US now and see how it looks. Thanks!

## 2024-05-13 NOTE — TELEPHONE ENCOUNTER
Caller: Fredy Palacios    Relationship: Self    Best call back number: [phone]     What medication are you requesting:     What are your current symptoms: TESTICLE SWOLLEN TO TWICE THE SIZE, PAIN    How long have you been experiencing symptoms: SINCE YESTERDAY    Have you had these symptoms before:    [] Yes  [x] No    Have you been treated for these symptoms before:   [] Yes  [x] No    If a prescription is needed, what is your preferred pharmacy and phone number: [facility] - [address] [phone] [facility] [phone] FX     Additional notes: THE SWELLING IS NOT GOING DOWN. HE TRIED ICE AND IT IS NOT REALLY HELPING. IS THERE SOMETHING THAT CAN BE SENT IN TO HELP WITH THIS OR CAN HE BE ADVISED ASAP? HE HAS AN ULTRASOUND TOMORROW AT [facility] FOR THIS. IF POSSIBLE PLEASE CALL HIM TODAY BECAUSE THE PAIN IS GETTING WORSE.

## 2024-05-13 NOTE — TELEPHONE ENCOUNTER
If the swelling and pain is acutely worse, I'd say let's go to the ER so they can do the US now and see how it looks. Thanks!

## 2024-05-13 NOTE — TELEPHONE ENCOUNTER
Name: PalaciosFredy Wren ELIDIA    Relationship: Self    Best Callback Number: [phone]    HUB PROVIDED THE RELAY MESSAGE FROM THE OFFICE   PATIENT VOICED UNDERSTANDING AND HAS NO FURTHER QUESTIONS AT THIS TIME    ADDITIONAL INFORMATION:

## 2024-05-14 ENCOUNTER — TELEPHONE (OUTPATIENT)
Dept: FAMILY MEDICINE CLINIC | Facility: CLINIC | Age: 69
End: 2024-05-14
Payer: MEDICARE

## 2024-05-14 DIAGNOSIS — N50.82 SCROTAL PAIN: Primary | ICD-10-CM

## 2024-05-14 NOTE — TELEPHONE ENCOUNTER
Caller: Fredy Palacios    Relationship: Self    Best call back number: [phone]     What is the medical concern/diagnosis: TESTICLE ISSUES     What specialty or service is being requested: UROLOGY     What is the provider, practice or medical service name:  UROLOGIST IN [place]     Any additional details:   PATIENT IS NEEDING A REFERRAL TO UROLOGY TO HELP WITH THE ONGOING ISSUES WITH HIS TESTICLES

## 2024-05-14 NOTE — TELEPHONE ENCOUNTER
Caller: Fredy Palacios    Relationship: Self    Best call back number: [phone]     What medication are you requesting: SOMETHING FOR TESTICAL PAIN     What are your current symptoms: PAIN IN TESTICAL AREA, SEEN AT ED LAST NIGHT     How long have you been experiencing symptoms: ?     Have you had these symptoms before:    [x] Yes  [] No    Have you been treated for these symptoms before:   [] Yes  [x] No    If a prescription is needed, what is your preferred pharmacy and phone number: [facility] - [facility] [address] [facility] - [phone] [facility] [phone] FX     Additional notes: WAS SEEN AT ED YESTERDAY PER FACUNDO. WANTS TO KNOW IF WE CAN CALL IN SOMETHING FOR PAIN. EXPLAINED CRYSTAL DOESN'T DO PRIMARY CARE ANYMORE BUT HE SAID SHE TOLD HIM SHE WOULD STILL SEE HIM.

## 2024-05-15 NOTE — TELEPHONE ENCOUNTER
PATIENT CALLED TO CHECK ON THE STATUS OF HIS REQUEST.    HE STATED HE IS IN A LOT OF PAIN IN HIS TESTICLE AREA    PATIENT STATED HE HAD AN ULTRASOUND 05/13/2024 AT [facility].    PATIENT IS REQUESTING SOMETHING FOR THE PAIN, HE WAS GIVEN LORTABS IN THE HOSPITAL. THE IBUPROFEN AND TYLENOL HE HAS BEEN TAKING IS NOT HELPING AT ALL. PLEASE ADVISE [phone]     [facility] - [address] - [phone] [facility] [phone] FX

## 2024-05-16 ENCOUNTER — TELEPHONE (OUTPATIENT)
Dept: UROLOGY | Facility: CLINIC | Age: 69
End: 2024-05-16
Payer: MEDICARE

## 2024-05-16 ENCOUNTER — APPOINTMENT (OUTPATIENT)
Dept: CT IMAGING | Facility: HOSPITAL | Age: 69
End: 2024-05-16
Payer: MEDICARE

## 2024-05-16 ENCOUNTER — APPOINTMENT (OUTPATIENT)
Dept: ULTRASOUND IMAGING | Facility: HOSPITAL | Age: 69
End: 2024-05-16
Payer: MEDICARE

## 2024-05-16 ENCOUNTER — TELEPHONE (OUTPATIENT)
Dept: FAMILY MEDICINE CLINIC | Facility: CLINIC | Age: 69
End: 2024-05-16

## 2024-05-16 ENCOUNTER — HOSPITAL ENCOUNTER (EMERGENCY)
Facility: HOSPITAL | Age: 69
Discharge: HOME | End: 2024-05-16
Attending: EMERGENCY MEDICINE
Payer: MEDICARE

## 2024-05-16 VITALS
HEART RATE: 69 BPM | SYSTOLIC BLOOD PRESSURE: 124 MMHG | HEIGHT: 71 IN | BODY MASS INDEX: 41.86 KG/M2 | TEMPERATURE: 98.1 F | OXYGEN SATURATION: 96 % | DIASTOLIC BLOOD PRESSURE: 78 MMHG | RESPIRATION RATE: 16 BRPM | WEIGHT: 299 LBS

## 2024-05-16 DIAGNOSIS — N50.812 PAIN IN LEFT TESTICLE: Primary | ICD-10-CM

## 2024-05-16 LAB
ALBUMIN SERPL-MCNC: 3.8 G/DL (ref 3.5–5.2)
ALBUMIN/GLOB SERPL: 1.2 G/DL
ALP SERPL-CCNC: 89 U/L (ref 39–117)
ALT SERPL W P-5'-P-CCNC: 18 U/L (ref 1–41)
ANION GAP SERPL CALCULATED.3IONS-SCNC: 11 MMOL/L (ref 5–15)
AST SERPL-CCNC: 24 U/L (ref 1–40)
BASOPHILS # BLD AUTO: 0.05 10*3/MM3 (ref 0–0.2)
BASOPHILS NFR BLD AUTO: 0.4 % (ref 0–1.5)
BILIRUB SERPL-MCNC: 0.7 MG/DL (ref 0–1.2)
BILIRUB UR QL STRIP: NEGATIVE
BUN SERPL-MCNC: 18 MG/DL (ref 8–23)
BUN/CREAT SERPL: 14.3 (ref 7–25)
CALCIUM SPEC-SCNC: 8.9 MG/DL (ref 8.6–10.5)
CHLORIDE SERPL-SCNC: 99 MMOL/L (ref 98–107)
CLARITY UR: CLEAR
CO2 SERPL-SCNC: 29 MMOL/L (ref 22–29)
COLOR UR: YELLOW
CREAT SERPL-MCNC: 1.26 MG/DL (ref 0.76–1.27)
DEPRECATED RDW RBC AUTO: 44.7 FL (ref 37–54)
EGFRCR SERPLBLD CKD-EPI 2021: 61.4 ML/MIN/1.73
EOSINOPHIL # BLD AUTO: 0.26 10*3/MM3 (ref 0–0.4)
EOSINOPHIL NFR BLD AUTO: 2.1 % (ref 0.3–6.2)
ERYTHROCYTE [DISTWIDTH] IN BLOOD BY AUTOMATED COUNT: 13.2 % (ref 12.3–15.4)
GLOBULIN UR ELPH-MCNC: 3.1 GM/DL
GLUCOSE SERPL-MCNC: 95 MG/DL (ref 65–99)
GLUCOSE UR STRIP-MCNC: NEGATIVE MG/DL
HCT VFR BLD AUTO: 46 % (ref 37.5–51)
HGB BLD-MCNC: 15.9 G/DL (ref 13–17.7)
HGB UR QL STRIP.AUTO: NEGATIVE
IMM GRANULOCYTES # BLD AUTO: 0.1 10*3/MM3 (ref 0–0.05)
IMM GRANULOCYTES NFR BLD AUTO: 0.8 % (ref 0–0.5)
KETONES UR QL STRIP: ABNORMAL
LEUKOCYTE ESTERASE UR QL STRIP.AUTO: NEGATIVE
LYMPHOCYTES # BLD AUTO: 3.12 10*3/MM3 (ref 0.7–3.1)
LYMPHOCYTES NFR BLD AUTO: 25.3 % (ref 19.6–45.3)
MCH RBC QN AUTO: 31.9 PG (ref 26.6–33)
MCHC RBC AUTO-ENTMCNC: 34.6 G/DL (ref 31.5–35.7)
MCV RBC AUTO: 92.4 FL (ref 79–97)
MONOCYTES # BLD AUTO: 0.84 10*3/MM3 (ref 0.1–0.9)
MONOCYTES NFR BLD AUTO: 6.8 % (ref 5–12)
NEUTROPHILS NFR BLD AUTO: 64.6 % (ref 42.7–76)
NEUTROPHILS NFR BLD AUTO: 7.95 10*3/MM3 (ref 1.7–7)
NITRITE UR QL STRIP: NEGATIVE
NRBC BLD AUTO-RTO: 0 /100 WBC (ref 0–0.2)
PH UR STRIP.AUTO: 8 [PH] (ref 5–8)
PLATELET # BLD AUTO: 168 10*3/MM3 (ref 140–450)
PMV BLD AUTO: 10.5 FL (ref 6–12)
POTASSIUM SERPL-SCNC: 3.9 MMOL/L (ref 3.5–5.2)
PROT SERPL-MCNC: 6.9 G/DL (ref 6–8.5)
PROT UR QL STRIP: NEGATIVE
RBC # BLD AUTO: 4.98 10*6/MM3 (ref 4.14–5.8)
SODIUM SERPL-SCNC: 139 MMOL/L (ref 136–145)
SP GR UR STRIP: 1.06 (ref 1–1.03)
UROBILINOGEN UR QL STRIP: ABNORMAL
WBC NRBC COR # BLD AUTO: 12.32 10*3/MM3 (ref 3.4–10.8)

## 2024-05-16 PROCEDURE — 74177 CT ABD & PELVIS W/CONTRAST: CPT

## 2024-05-16 PROCEDURE — 80053 COMPREHEN METABOLIC PANEL: CPT | Performed by: EMERGENCY MEDICINE

## 2024-05-16 PROCEDURE — 76870 US EXAM SCROTUM: CPT

## 2024-05-16 PROCEDURE — 81003 URINALYSIS AUTO W/O SCOPE: CPT | Performed by: PHYSICIAN ASSISTANT

## 2024-05-16 PROCEDURE — 99285 EMERGENCY DEPT VISIT HI MDM: CPT

## 2024-05-16 PROCEDURE — 25010000002 MORPHINE PER 10 MG: Performed by: EMERGENCY MEDICINE

## 2024-05-16 PROCEDURE — 25810000003 SODIUM CHLORIDE 0.9 % SOLUTION: Performed by: EMERGENCY MEDICINE

## 2024-05-16 PROCEDURE — 96374 THER/PROPH/DIAG INJ IV PUSH: CPT

## 2024-05-16 PROCEDURE — 96361 HYDRATE IV INFUSION ADD-ON: CPT

## 2024-05-16 PROCEDURE — 25510000001 IOPAMIDOL 61 % SOLUTION: Performed by: EMERGENCY MEDICINE

## 2024-05-16 PROCEDURE — 85025 COMPLETE CBC W/AUTO DIFF WBC: CPT | Performed by: EMERGENCY MEDICINE

## 2024-05-16 PROCEDURE — 25010000002 ONDANSETRON PER 1 MG: Performed by: EMERGENCY MEDICINE

## 2024-05-16 PROCEDURE — 96375 TX/PRO/DX INJ NEW DRUG ADDON: CPT

## 2024-05-16 RX ORDER — MORPHINE SULFATE 4 MG/ML
4 INJECTION, SOLUTION INTRAMUSCULAR; INTRAVENOUS ONCE
Status: COMPLETED | OUTPATIENT
Start: 2024-05-16 | End: 2024-05-16

## 2024-05-16 RX ORDER — HYDROCODONE BITARTRATE AND ACETAMINOPHEN 5; 325 MG/1; MG/1
1 TABLET ORAL EVERY 6 HOURS PRN
Qty: 10 TABLET | Refills: 0 | Status: SHIPPED | OUTPATIENT
Start: 2024-05-16

## 2024-05-16 RX ORDER — ONDANSETRON 2 MG/ML
4 INJECTION INTRAMUSCULAR; INTRAVENOUS ONCE
Status: COMPLETED | OUTPATIENT
Start: 2024-05-16 | End: 2024-05-16

## 2024-05-16 RX ADMIN — IOPAMIDOL 90 ML: 612 INJECTION, SOLUTION INTRAVENOUS at 14:54

## 2024-05-16 RX ADMIN — SODIUM CHLORIDE 500 ML: 9 INJECTION, SOLUTION INTRAVENOUS at 15:11

## 2024-05-16 RX ADMIN — ONDANSETRON 4 MG: 2 INJECTION INTRAMUSCULAR; INTRAVENOUS at 15:11

## 2024-05-16 RX ADMIN — MORPHINE SULFATE 4 MG: 4 INJECTION, SOLUTION INTRAMUSCULAR; INTRAVENOUS at 15:11

## 2024-05-16 NOTE — Clinical Note
[facility] EMERGENCY DEPARTMENT  [address]  [facility] [address]  Phone: [phone]    Fredy Palacios was seen and treated in our emergency department on 5/16/2024.  He may return to work on 05/20/2024.         Thank you for choosing [facility].    Tash Ames MD

## 2024-05-16 NOTE — ED PROVIDER NOTES
Subjective  History of Present Illness:    Chief Complaint: Left testicle and groin pain  History of Present Illness: 68-year-old male presents with left testicle pain, symptoms for 1 week, he was seen at an outside ER at [facility] last week, had labs drawn, and an ultrasound, he was sent home with steroids and was told to follow-up with urology.  He is unable to get in with urology and continues to have pain, he was advised to come to the ER for further evaluation.  No injury to the area.  No painful urination, no blood no discharge.  He does have significant past medical history for COPD, previous skin cancer, obesity, anxiety.  Onset: Gradual onset  Duration: Symptoms were 1 week  Exacerbating / Alleviating factors: Pain is worse with movement or to touch  Associated symptoms: No urinary symptoms      Nurses Notes reviewed and agree, including vitals, allergies, social history and prior medical history.     REVIEW OF SYSTEMS: All systems reviewed and not pertinent unless noted.    Review of Systems   Genitourinary:  Positive for scrotal swelling and testicular pain.   All other systems reviewed and are negative.      Past Medical History:   Diagnosis Date    Allergic     Antibiotics    Anxiety     Benign hypertension     Chronic obstructive lung disease     Cramp in lower leg     Dyspnea on exertion     Joint pain     Malignant melanoma of skin     Morbid obesity     Peripheral edema     Tinea pedis        Allergies:    Azithromycin, Bactrim [sulfamethoxazole-trimethoprim], Cephalexin, Levofloxacin, and Penicillins      History reviewed. No pertinent surgical history.      Social History     Socioeconomic History    Marital status:    Tobacco Use    Smoking status: Former     Types: Cigars     Quit date:      Years since quittin.3     Passive exposure: Never    Smokeless tobacco: Former     Types: Chew     Quit date: 2023    Tobacco comments:     Chewed tobacco and dip  "quit about a year ago   Vaping Use    Vaping status: Never Used   Substance and Sexual Activity    Alcohol use: Not Currently    Drug use: Never    Sexual activity: Yes     Partners: Female     Birth control/protection: Post-menopausal         Family History   Problem Relation Age of Onset    Cancer Mother     Alcohol abuse Father     Coronary artery disease Father        Objective  Physical Exam:  /78   Pulse 69   Temp 98.1 °F (36.7 °C) (Oral)   Resp 16   Ht 180.3 cm (71\")   Wt 136 kg (299 lb)   SpO2 96%   BMI 41.70 kg/m²      Physical Exam  Vitals and nursing note reviewed.   Constitutional:       Appearance: He is well-developed.   HENT:      Head: Normocephalic and atraumatic.      Mouth/Throat:      Mouth: Mucous membranes are moist.   Eyes:      Extraocular Movements: Extraocular movements intact.   Cardiovascular:      Rate and Rhythm: Normal rate and regular rhythm.   Pulmonary:      Effort: Pulmonary effort is normal.      Breath sounds: Normal breath sounds.   Abdominal:      Palpations: Abdomen is soft.   Musculoskeletal:         General: Normal range of motion.      Cervical back: Normal range of motion.   Skin:     General: Skin is warm and dry.   Neurological:      Mental Status: He is alert and oriented to person, place, and time.   Psychiatric:         Behavior: Behavior normal.         Thought Content: Thought content normal.         Judgment: Judgment normal.           Procedures    ED Course:      CT abdomen pelvis interpretation by me: No acute intra-abdominal abnormality, no bowel obstruction or free air  ED Course as of 05/16/24 2130   Thu May 16, 2024   1406 Review of previous  non ED visits, prior labs, prior imaging, available notes from prior evaluations or visits with specialists, medication list, allergies, past medical history, past surgical history     [CS]   1601 I updated the patient/family  on all pending labs and lab results, and all pending radiology and  results and " answered all questions.   [CS]   1601 Ua added [CS]      ED Course User Index  [CS] Monty Leggett Jr., NATALIE       Lab Results (last 24 hours)       Procedure Component Value Units Date/Time    CBC Auto Differential [[id number]]  (Abnormal) Collected: 05/16/24 1357    Specimen: Blood Updated: 05/16/24 1419     WBC 12.32 10*3/mm3      RBC 4.98 10*6/mm3      Hemoglobin 15.9 g/dL      Hematocrit 46.0 %      MCV 92.4 fL      MCH 31.9 pg      MCHC 34.6 g/dL      RDW 13.2 %      RDW-SD 44.7 fl      MPV 10.5 fL      Platelets 168 10*3/mm3      Neutrophil % 64.6 %      Lymphocyte % 25.3 %      Monocyte % 6.8 %      Eosinophil % 2.1 %      Basophil % 0.4 %      Immature Grans % 0.8 %      Neutrophils, Absolute 7.95 10*3/mm3      Lymphocytes, Absolute 3.12 10*3/mm3      Monocytes, Absolute 0.84 10*3/mm3      Eosinophils, Absolute 0.26 10*3/mm3      Basophils, Absolute 0.05 10*3/mm3      Immature Grans, Absolute 0.10 10*3/mm3      nRBC 0.0 /100 WBC     Comprehensive Metabolic Panel [[id number]] Collected: 05/16/24 1357    Specimen: Blood Updated: 05/16/24 1425     Glucose 95 mg/dL      BUN 18 mg/dL      Creatinine 1.26 mg/dL      Sodium 139 mmol/L      Potassium 3.9 mmol/L      Chloride 99 mmol/L      CO2 29.0 mmol/L      Calcium 8.9 mg/dL      Total Protein 6.9 g/dL      Albumin 3.8 g/dL      ALT (SGPT) 18 U/L      AST (SGOT) 24 U/L      Alkaline Phosphatase 89 U/L      Total Bilirubin 0.7 mg/dL      Globulin 3.1 gm/dL      Comment: Calculated Result        A/G Ratio 1.2 g/dL      BUN/Creatinine Ratio 14.3     Anion Gap 11.0 mmol/L      eGFR 61.4 mL/min/1.73     Narrative:      GFR Normal >60  Chronic Kidney Disease <60  Kidney Failure <15      Urinalysis With Culture If Indicated - Urine, Clean Catch [[id number]]  (Abnormal) Collected: 05/16/24 1605    Specimen: Urine, Clean Catch Updated: 05/16/24 1623     Color, UA Yellow     Appearance, UA Clear     pH, UA 8.0     Specific Gravity, UA 1.056     Glucose, UA Negative      Ketones, UA Trace     Bilirubin, UA Negative     Blood, UA Negative     Protein, UA Negative     Leuk Esterase, UA Negative     Nitrite, UA Negative     Urobilinogen, UA 0.2 E.U./dL    Narrative:      In absence of clinical symptoms, the presence of pyuria, bacteria, and/or nitrites on the urinalysis result does not correlate with infection.  Urine microscopic not indicated.             US Scrotum & Testicles    Result Date: 5/16/2024  US SCROTUM AND TESTICLES Date of Exam: 5/16/2024 2:11 PM EDT Indication: left testicle pain. Comparison: No comparisons available. Technique: Multiple sonographic images of the scrotum were obtained in transverse and longitudinal planes. Grayscale and color Doppler duplex techniques were utilized.  Doppler spectral analysis was performed. Findings: The right testicle measures 4.0 x 2.8 x 3.4 cm. The left testicle measures 2.4 x 1.0 x 1.1 cm. Testicular echotexture is homogenous bilaterally. Symmetric bilateral vascular flow without evidence of testicular torsion. The epididymides are within normal limits without findings of epididymitis. Small bilateral hydroceles. No evidence of varicocele. Scrotal wall thickness is within normal limits.     Impression: Impression: No evidence of testicular torsion. Small bilateral hydroceles. Electronically Signed: Ren Hoffman MD  5/16/2024 3:17 PM EDT  Workstation ID: [id number]    CT Abdomen Pelvis With Contrast    Result Date: 5/16/2024  CT ABDOMEN PELVIS W CONTRAST Date of Exam: 5/16/2024 2:44 PM EDT Indication: left testicle pain/swelling. Comparison: None available. Technique: Axial CT images were obtained of the abdomen and pelvis following the uneventful intravenous administration of 90 mL Isovue-300. Reconstructed coronal and sagittal images were also obtained. Automated exposure control and iterative construction methods were used. Findings: Lower thorax: No focal consolidation. Liver: Hepatic steatosis. No focal hepatic lesion.  Gallbladder and bile ducts: Cholelithiasis with otherwise unremarkable gallbladder. No biliary ductal dilatation. Pancreas: No pancreatic duct dilation. No surrounding inflammation. Spleen: Normal in size. Adrenal glands: Thickening of the bilateral adrenal glands without discrete nodule. Kidneys: Symmetric in size and enhancement. No hydronephrosis. Normal excretion of contrast on delayed phase. Urinary bladder: Unremarkable. Reproductive organs: Prostatomegaly. Stomach and bowel: No evidence of bowel obstruction. Lymph nodes: No pathologically enlarged lymph nodes. Vessels: No abdominal aortic aneurysm. Atherosclerosis. Major vasculature is patent. Peritoneum and retroperitoneum: No free air or free fluid. Soft tissues: Small bilateral fat-containing inguinal hernias. Osseous structures: No acute or suspicious osseous lesions. Multilevel degenerative changes of the spine.     Impression: Impression: No acute abdominopelvic findings. Cholelithiasis without evidence of acute cholecystitis. Hepatic steatosis. Additional ancillary findings as above. Electronically Signed: eRn Hoffman MD  5/16/2024 3:08 PM EDT  Workstation ID: [id number]        Medical Decision Making  Patient Presentation-68year-old male with left testicular pain, on my assessment he had pain on palpation of the left testicle, he was in no acute distress and stable    DDX orchitis, testicular torsion, UTI, kidney stone, hernia.    Data Review/ Non ED Records /Analysis/Ordering unique tests  Review of previous  non ED visits, prior labs, prior imaging, available notes from prior evaluations or visits with specialists, medication list, allergies, past medical history, past surgical history        Independent Review Studies  I Personally reviewed all laboratory studies performed in the emergency department     Intervention/Re-evaluation intervention included IV pain medication and oral pain medication on discharge.    Independent Clinician discussed the  case with my supervising physician Dr. Ames    Risk Stratification tools/clinical decision rules patient presented with testicular pain, considered emergent condition such as testicular torsion or other findings that may contribute to pain such as orchitis, epididymitis, or cellulitis, also was concerned about the possibility of a inguinal hernia or kidney stone.  CT scan and ultrasound of the testicles were performed that were unremarkable, due to pain on palpation, the symptoms seemed consistent with an orchitis, he was prescribed pain medication and antibiotics, and recommendation and referral to urologist.    Shared Decision Making discussed this plan of care with patient and he was agreeable    Disposition patient stable for discharge    Problems Addressed:  Pain in left testicle: complicated acute illness or injury    Amount and/or Complexity of Data Reviewed  External Data Reviewed: labs, radiology and notes.  Labs: ordered. Decision-making details documented in ED Course.  Radiology: ordered and independent interpretation performed. Decision-making details documented in ED Course.    Risk  Prescription drug management.          Final diagnoses:   Pain in left testicle           Disposition discharged       Monty Leggett Jr., NATALIE  05/16/24 7815

## 2024-05-16 NOTE — TELEPHONE ENCOUNTER
"US printed. States \"Small bilateral hydoceles otherwise unremarkable scrotal ultrasound\"     See message below from pt, requesting pain medication  "

## 2024-05-16 NOTE — TELEPHONE ENCOUNTER
Patient called in, he expressed that he was in a lot of pain due to swollen testicles and that he had not heard from urology.  I spoke with clinical to see if we can get the patient in for an appointment since [facility] was out today. I was informed due to what is going on with the patient he would need to go to the ER. Patient said he went to the ER in [place] on 5/15/2024, they did an ultrasound and sent him on his way with pain meds. He said he was in a lot of pain and no one from urology has called him tried to explain he would need to contact urology in which I was ready to give him the number and that clinical advise going to [facility] and that we wouldn't be able to do much in the office. He refused urology number and said what good would that do for him. I also tried talking to him further to see if I could find another solution, and he said forget it, he is done with us and hung up.

## 2024-05-16 NOTE — TELEPHONE ENCOUNTER
Rosario transferred PT's call to our office after speaking with him regarding his continued testicular pain and swelling. A referral was sent in yesterday but has not been worked so we can't see PT in office for his acute symptoms. I advised PT if he needs care for his pain, to present to [facility] where they could control his pain and consult urology if needed.

## 2024-05-23 DIAGNOSIS — M54.50 LUMBAR PAIN: ICD-10-CM

## 2024-05-24 RX ORDER — TRAMADOL HYDROCHLORIDE 50 MG/1
50 TABLET ORAL EVERY 12 HOURS PRN
Qty: 60 TABLET | Refills: 2 | Status: SHIPPED | OUTPATIENT
Start: 2024-05-24

## 2024-05-30 ENCOUNTER — OFFICE VISIT (OUTPATIENT)
Dept: UROLOGY | Facility: CLINIC | Age: 69
End: 2024-05-30
Payer: MEDICARE

## 2024-05-30 ENCOUNTER — TELEPHONE (OUTPATIENT)
Dept: UROLOGY | Facility: CLINIC | Age: 69
End: 2024-05-30

## 2024-05-30 VITALS — BODY MASS INDEX: 41.72 KG/M2 | WEIGHT: 298 LBS | HEIGHT: 71 IN

## 2024-05-30 DIAGNOSIS — N50.812 TESTICULAR PAIN, LEFT: Primary | ICD-10-CM

## 2024-05-30 RX ORDER — CIPROFLOXACIN 500 MG/1
500 TABLET, FILM COATED ORAL 2 TIMES DAILY
Qty: 28 TABLET | Refills: 0 | Status: SHIPPED | OUTPATIENT
Start: 2024-05-30

## 2024-05-30 NOTE — TELEPHONE ENCOUNTER
Caller: LENIN ZAMUDIO     Relationship: SELF    Best call back number: [phone]    What is your medical concern? PT WOULD LIKE MEDICATION CALLED IN THAT PREVENTS YEAST INFECTIONS.     How long has this issue been going on? N/A    Is your provider already aware of this issue? N/A    Have you been treated for this issue? N/A

## 2024-05-30 NOTE — PROGRESS NOTES
Office Visit [facility]      Patient Name: Fredy Palacios  : 1955   MRN: [id number]     Chief Complaint:    Chief Complaint   Patient presents with    Left hydrocele       Referring Provider: Monty Leggett Jr., *    History of Present Illness: Fredy Palacios is a 68 y.o. male who presents to Urology today for evaluation of new onset left testicular pain and discomfort.  Patient reports multi week history of symptoms, presented to [facility] ED 2024.  He reports left greater than right testicular discomfort.  Patient underwent scrotal ultrasound at time of evaluation with no evidence of testicular torsion, intact bilateral blood flow, no intratesticular mass or lesion.  Small bilateral hydrocele.  UA at time of evaluation with no blood, nitrite, LE.  He denies significant bothersome lower urinary tract symptoms.  Has prior history of urinary tract infection.  Denies history of gross hematuria.  Denies past urologic instrumentation or procedure.      Subjective      Review of System: Review of Systems   Genitourinary:  Negative for decreased urine volume, difficulty urinating, dysuria, enuresis, flank pain, frequency, hematuria and urgency.      I have reviewed the ROS documented by my clinical staff, updated appropriately and I agree. Navin Licona MD    Past Medical History:   Past Medical History:   Diagnosis Date    Allergic     Antibiotics    Anxiety     Benign hypertension     Chronic obstructive lung disease     Cramp in lower leg     Dyspnea on exertion     Joint pain     Malignant melanoma of skin     Morbid obesity     Peripheral edema     Tinea pedis        Past Surgical History: History reviewed. No pertinent surgical history.    Family History:   Family History   Problem Relation Age of Onset    Cancer Mother     Alcohol abuse Father     Coronary artery disease Father        Social History:   Social History     Socioeconomic History    Marital status:    Tobacco Use     Smoking status: Former     Current packs/day: 0.00     Types: Cigars, Cigarettes     Quit date: 2016     Years since quittin.4     Passive exposure: Never    Smokeless tobacco: Former     Types: Chew     Quit date: 2023    Tobacco comments:     Chewed tobacco and dip quit about a year ago   Vaping Use    Vaping status: Never Used   Substance and Sexual Activity    Alcohol use: Not Currently    Drug use: Never    Sexual activity: Yes     Partners: Female     Birth control/protection: Post-menopausal       Medications:     Current Outpatient Medications:     bumetanide (BUMEX) 2 MG tablet, Take 1 tablet by mouth Daily., Disp: 90 tablet, Rfl: 1    busPIRone (BUSPAR) 10 MG tablet, Take 1 tablet by mouth 2 (Two) Times a Day., Disp: 180 tablet, Rfl: 1    gabapentin (NEURONTIN) 300 MG capsule, Take 1 capsule by mouth 3 (Three) Times a Day., Disp: 270 capsule, Rfl: 0    HYDROcodone-acetaminophen (NORCO) 5-325 MG per tablet, Take 1 tablet by mouth Every 8 (Eight) Hours As Needed for Severe Pain., Disp: 12 tablet, Rfl: 0    HYDROcodone-acetaminophen (NORCO) 5-325 MG per tablet, Take 1 tablet by mouth Every 6 (Six) Hours As Needed for Mild Pain., Disp: 10 tablet, Rfl: 0    meclizine (ANTIVERT) 25 MG tablet, Take 1 tablet by mouth 3 (Three) Times a Day As Needed for Dizziness., Disp: 30 tablet, Rfl: 0    montelukast (SINGULAIR) 10 MG tablet, Take 1 tablet by mouth Every Night., Disp: 90 tablet, Rfl: 3    omeprazole (priLOSEC) 40 MG capsule, Take 1 capsule by mouth Daily., Disp: 90 capsule, Rfl: 1    potassium chloride (K-DUR,KLOR-CON) 20 MEQ CR tablet, Take 1 tablet by mouth Daily., Disp: 90 tablet, Rfl: 1    traMADol (ULTRAM) 50 MG tablet, TAKE 1 TABLET BY MOUTH EVERY 12 HOURS AS NEEDED FOR MODERATE PAIN., Disp: 60 tablet, Rfl: 2    traZODone (DESYREL) 50 MG tablet, TAKE 1 TO 2 TABLETS AT BEDTIME, Disp: 180 tablet, Rfl: 1    ciprofloxacin (Cipro) 500 MG tablet, Take 1 tablet by mouth 2 (Two) Times a Day., Disp: 28  "tablet, Rfl: 0    fluconazole (Diflucan) 100 MG tablet, Take 1 tablet by mouth Daily for 3 days., Disp: 3 tablet, Rfl: 0    FLUoxetine (PROzac) 20 MG capsule, Take 3 capsules by mouth Daily., Disp: 270 capsule, Rfl: 1    Allergies:   Allergies   Allergen Reactions    Azithromycin Other (See Comments)     Unknown reaction    Bactrim [Sulfamethoxazole-Trimethoprim] Swelling    Cephalexin Swelling    Levofloxacin Swelling    Penicillins Rash             Objective     Physical Exam:   Vital Signs:   Vitals:    05/30/24 1112   Weight: 135 kg (298 lb)   Height: 180.3 cm (70.98\")   PainSc: 0-No pain     Body mass index is 41.58 kg/m².     Physical Exam  Vitals and nursing note reviewed.   Constitutional:       Appearance: Normal appearance.   HENT:      Head: Normocephalic and atraumatic.   Cardiovascular:      Comments: Well perfused  Pulmonary:      Effort: Pulmonary effort is normal.   Abdominal:      General: Abdomen is flat.      Palpations: Abdomen is soft.   Musculoskeletal:         General: Normal range of motion.   Skin:     General: Skin is warm and dry.   Neurological:      General: No focal deficit present.      Mental Status: He is alert and oriented to person, place, and time. Mental status is at baseline.   Psychiatric:         Mood and Affect: Mood normal.         Behavior: Behavior normal.         Thought Content: Thought content normal.         Judgment: Judgment normal.         Genitourinary  Penis: circumcised penis, glans normal, no penile discharge.  No rashes/lesions.    Testes: descended bilaterally, no masses, right testicle non tender to palpation, left testicle tender to palpation.. Remainder of scrotal contents normal. No hernia appreciated.      Labs:   Brief Urine Lab Results  (Last result in the past 365 days)        Color   Clarity   Blood   Leuk Est   Nitrite   Protein   CREAT   Urine HCG        05/16/24 1605 Yellow   Clear   Negative   Negative   Negative   Negative                    "     Lab Results   Component Value Date    GLUCOSE 95 05/16/2024    CALCIUM 8.9 05/16/2024     05/16/2024    K 3.9 05/16/2024    CO2 29.0 05/16/2024    CL 99 05/16/2024    BUN 18 05/16/2024    CREATININE 1.26 05/16/2024    BCR 14.3 05/16/2024    ANIONGAP 11.0 05/16/2024       Lab Results   Component Value Date    WBC 12.32 (H) 05/16/2024    HGB 15.9 05/16/2024    HCT 46.0 05/16/2024    MCV 92.4 05/16/2024     05/16/2024       Images:   US Scrotum & Testicles    Result Date: 5/16/2024  Impression: No evidence of testicular torsion. Small bilateral hydroceles. Electronically Signed: Ren Hoffman MD  5/16/2024 3:17 PM EDT  Workstation ID: [id number]    CT Abdomen Pelvis With Contrast    Result Date: 5/16/2024  Impression: No acute abdominopelvic findings. Cholelithiasis without evidence of acute cholecystitis. Hepatic steatosis. Additional ancillary findings as above. Electronically Signed: Ren Hoffman MD  5/16/2024 3:08 PM EDT  Workstation ID: [id number]    XR Hand 3+ View Left    Result Date: 5/2/2024  Impression: No acute fracture or malalignment. Severe degenerative change of the first CMC joint. Electronically Signed: Arnol Lee MD  5/2/2024 8:41 AM EDT  Workstation ID: [id number]    XR Wrist 3+ View Left    Result Date: 5/2/2024  Impression: No acute fracture or malalignment. Severe degenerative change of the first CMC joint. Electronically Signed: Arnol Lee MD  5/2/2024 8:41 AM EDT  Workstation ID: [id number]      Measures:   Tobacco:   Fredy Palacios  reports that he quit smoking about 8 years ago. His smoking use included cigars and cigarettes. He has never been exposed to tobacco smoke. He quit smokeless tobacco use about 16 months ago.  His smokeless tobacco use included chew. I have educated him on the risk of diseases from using tobacco products.   Assessment / Plan      Assessment/Plan:   68 y.o. male is here today for evaluation of left greater than right testicular pain.  He  reports a multi week history of sudden onset symptoms.  Denies associated lower urinary tract symptoms.  Scrotal ultrasound revealing intact bilateral blood flow, no intratesticular mass or lesion, small bilateral hydrocele.  Exam today unremarkable.  We have discussed conservative strategies to reduce symptoms.  We will trial 2-week course of p.o. antibiotic therapy.  We will send a urine culture today.  He will follow-up in 2 to 3 weeks for symptom check.    Diagnoses and all orders for this visit:    1. Testicular pain, left (Primary)  -     ciprofloxacin (Cipro) 500 MG tablet; Take 1 tablet by mouth 2 (Two) Times a Day.  Dispense: 28 tablet; Refill: 0    Other orders  -     LABS SCANNED           Follow Up:   Return in about 3 weeks (around 6/20/2024) for Recheck.    I spent approximately 45 minutes providing clinical care for this patient; including review of patient's chart and provider documentation, face to face time spent with patient in examination room (obtaining history, performing physical exam, discussing diagnosis and management options), placing orders, and completing patient documentation.     Navin Licona MD  [facility] [place]

## 2024-05-30 NOTE — TELEPHONE ENCOUNTER
Spoke with patient and he stated that he forgot to ask you to send him a diflucan for yeast infection?  If so he would like it send to [facility] in [place].

## 2024-05-31 ENCOUNTER — TELEPHONE (OUTPATIENT)
Dept: UROLOGY | Facility: CLINIC | Age: 69
End: 2024-05-31

## 2024-05-31 NOTE — TELEPHONE ENCOUNTER
Caller: Fredy Palacios     Relationship: SELF    Best call back number: [phone]     What is the best time to reach you: ANYTIME    Who are you requesting to speak with (clinical staff, provider,  specific staff member): CLINICAL    Do you know the name of the person who called: PT CALLED OFFICE    What was the call regarding: PT ASKING ABOUT THE MEDICATION FOR A YEAST INFECTION.   HIS PHARMACY IS   [facility] - [place] [id number] [facility] - [phone] [facility] [phone] FX     PLEASE GIVE PT A CALL WHEN THAT HAS BEEN SENT.     Is it okay if the provider responds through [employer]: YES

## 2024-06-03 ENCOUNTER — TELEPHONE (OUTPATIENT)
Dept: UROLOGY | Facility: CLINIC | Age: 69
End: 2024-06-03
Payer: MEDICARE

## 2024-06-03 DIAGNOSIS — B37.9 YEAST INFECTION: Primary | ICD-10-CM

## 2024-06-03 RX ORDER — FLUCONAZOLE 100 MG/1
100 TABLET ORAL DAILY
Qty: 3 TABLET | Refills: 0 | Status: SHIPPED | OUTPATIENT
Start: 2024-06-03 | End: 2024-06-06

## 2024-06-03 NOTE — TELEPHONE ENCOUNTER
Spoke with the patient and let him know that his cx results were negative.  He expressed understanding.

## 2024-06-03 NOTE — TELEPHONE ENCOUNTER
----- Message from Navin Licona sent at 6/3/2024 10:30 AM EDT -----  Can we let patient know Ucx was NG. Thanks  ----- Message -----  From: Haydee Santa Incoming  Sent: 6/3/2024  10:29 AM EDT  To: Navin Licona MD

## 2024-06-04 ENCOUNTER — TELEPHONE (OUTPATIENT)
Dept: FAMILY MEDICINE CLINIC | Facility: CLINIC | Age: 69
End: 2024-06-04

## 2024-06-04 DIAGNOSIS — F41.1 GENERALIZED ANXIETY DISORDER: ICD-10-CM

## 2024-06-04 NOTE — TELEPHONE ENCOUNTER
Caller: Fredy Palacios    Relationship: Self    Best call back number: [phone]    Which medication are you concerned about: FLUoxetine (PROzac) 20 MG capsule Take 3 capsules by mouth Daily.     Who prescribed you this medication: RADHA AGUILRA       What are your concerns: PATIENT CALLED REQUESTING A DOSE INCREASE. HE CURRENTLY TAKES 60 MG DAILY BUT WANTS A HIGHER DOSE. PLEASE ADVISE AND CALL PATIENT

## 2024-06-04 NOTE — TELEPHONE ENCOUNTER
Patient called and would like to increase the dose on prozac. He is already taking 60mg. Can we increase? Does he need to be seen?

## 2024-06-05 PROBLEM — N50.812 TESTICULAR PAIN, LEFT: Status: ACTIVE | Noted: 2024-06-05

## 2024-06-05 RX ORDER — FLUOXETINE HYDROCHLORIDE 20 MG/1
60 CAPSULE ORAL DAILY
Qty: 270 CAPSULE | Refills: 1 | Status: SHIPPED | OUTPATIENT
Start: 2024-06-05

## 2024-06-06 NOTE — TELEPHONE ENCOUNTER
Pt states the 60 mg dose is not working for him. I informed pt he would possibly need to come in for appt so [facility] can re evaluate him and offer something different.  Does pt needs appt?

## 2024-06-07 ENCOUNTER — TELEPHONE (OUTPATIENT)
Dept: UROLOGY | Facility: CLINIC | Age: 69
End: 2024-06-07
Payer: MEDICARE

## 2024-06-07 NOTE — TELEPHONE ENCOUNTER
Let's schedule him for a virtual visit so we can talk about options and I can see what symptoms he is having. Thanks!

## 2024-06-07 NOTE — TELEPHONE ENCOUNTER
Patient called and wanted to let  know that he has been taking the abx for a week for his testicle pain/swelling and it hasn't helped any. He wanted to know if he should change the abx, continue the abx, or stop the abx. Also wanted to know if he needed to be seen sooner. I informed him I would send  a message and give him a call back.

## 2024-06-17 ENCOUNTER — TELEMEDICINE (OUTPATIENT)
Dept: FAMILY MEDICINE CLINIC | Facility: CLINIC | Age: 69
End: 2024-06-17
Payer: MEDICARE

## 2024-06-17 DIAGNOSIS — R42 DIZZINESS: Primary | ICD-10-CM

## 2024-06-17 DIAGNOSIS — F41.1 GENERALIZED ANXIETY DISORDER: ICD-10-CM

## 2024-06-17 PROCEDURE — 1126F AMNT PAIN NOTED NONE PRSNT: CPT | Performed by: NURSE PRACTITIONER

## 2024-06-17 PROCEDURE — 99214 OFFICE O/P EST MOD 30 MIN: CPT | Performed by: NURSE PRACTITIONER

## 2024-06-17 RX ORDER — BUSPIRONE HYDROCHLORIDE 15 MG/1
15 TABLET ORAL 2 TIMES DAILY
Qty: 60 TABLET | Refills: 5 | Status: SHIPPED | OUTPATIENT
Start: 2024-06-17

## 2024-06-17 NOTE — PROGRESS NOTES
Chief Complaint  little bit of everything and Dizziness (Meclizine refill)  This was a video and audio enabled telemedicine encounter. The patient was in home. The provider was in office.     Subjective          Fredy Palacios presents to [facility]  History of Present Illness  Pt has had increased anxiety for the past few months. He has been taking Prozac 60mg and Buspar 10 mg BID.  He has had dizziness for the past several months. He has had several falls recently.       Objective   Vital Signs:   There were no vitals taken for this visit.    There is no height or weight on file to calculate BMI.    Review of Systems   Constitutional:  Negative for fatigue and fever.   Respiratory:  Negative for shortness of breath.    Cardiovascular:  Negative for chest pain, palpitations and leg swelling.   Neurological:  Positive for dizziness. Negative for syncope.   Psychiatric/Behavioral:  Positive for agitation. The patient is nervous/anxious.           Current Outpatient Medications:     bumetanide (BUMEX) 2 MG tablet, Take 1 tablet by mouth Daily., Disp: 90 tablet, Rfl: 1    busPIRone (BUSPAR) 15 MG tablet, Take 1 tablet by mouth 2 (Two) Times a Day., Disp: 60 tablet, Rfl: 5    FLUoxetine (PROzac) 20 MG capsule, Take 3 capsules by mouth Daily., Disp: 270 capsule, Rfl: 1    gabapentin (NEURONTIN) 300 MG capsule, Take 1 capsule by mouth 3 (Three) Times a Day., Disp: 270 capsule, Rfl: 0    HYDROcodone-acetaminophen (NORCO) 5-325 MG per tablet, Take 1 tablet by mouth Every 6 (Six) Hours As Needed for Mild Pain., Disp: 10 tablet, Rfl: 0    montelukast (SINGULAIR) 10 MG tablet, Take 1 tablet by mouth Every Night., Disp: 90 tablet, Rfl: 3    omeprazole (priLOSEC) 40 MG capsule, Take 1 capsule by mouth Daily., Disp: 90 capsule, Rfl: 1    potassium chloride (K-DUR,KLOR-CON) 20 MEQ CR tablet, Take 1 tablet by mouth Daily., Disp: 90 tablet, Rfl: 1    traMADol (ULTRAM) 50 MG tablet, TAKE 1 TABLET BY  MOUTH EVERY 12 HOURS AS NEEDED FOR MODERATE PAIN., Disp: 60 tablet, Rfl: 2    traZODone (DESYREL) 50 MG tablet, TAKE 1 TO 2 TABLETS AT BEDTIME, Disp: 180 tablet, Rfl: 1      Allergies: Azithromycin, Bactrim [sulfamethoxazole-trimethoprim], Cephalexin, Levofloxacin, and Penicillins    Physical Exam  Constitutional:       Appearance: Normal appearance.   Neurological:      Mental Status: He is alert.   Psychiatric:         Mood and Affect: Mood normal.         Behavior: Behavior normal.         Thought Content: Thought content normal.         Judgment: Judgment normal.          Result Review :                   Assessment and Plan    Diagnoses and all orders for this visit:    1. Dizziness (Primary)  Comments:  MRI and carotid doppler ordered. Keep appt with neurology in July. We may refer to ENT if not improving. Go to ER for worsened sx.  Orders:  -     MRI Brain Without Contrast; Future  -     Duplex Carotid Ultrasound CAR; Future    2. Generalized anxiety disorder  Comments:  Raise dose of Buspar. Continue Prozac. RTC in 1 month if not improving.  Orders:  -     busPIRone (BUSPAR) 15 MG tablet; Take 1 tablet by mouth 2 (Two) Times a Day.  Dispense: 60 tablet; Refill: 5                Follow Up   No follow-ups on file.  Patient was given instructions and counseling regarding his condition or for health maintenance advice. Please see specific information pulled into the AVS if appropriate.     RADHA Baig

## 2024-06-25 DIAGNOSIS — M54.50 LUMBAR PAIN: ICD-10-CM

## 2024-06-25 NOTE — TELEPHONE ENCOUNTER
Caller: Philip Fredy ELIDIA    Relationship: Self    Best call back number: [phone]    Requested Prescriptions:   Requested Prescriptions     Pending Prescriptions Disp Refills    gabapentin (NEURONTIN) 300 MG capsule 270 capsule 0     Sig: Take 1 capsule by mouth 3 (Three) Times a Day.        Pharmacy where request should be sent: [facility] [phone] [facility] [phone]      Last office visit with prescribing clinician: 5/1/2024   Last telemedicine visit with prescribing clinician: 6/17/2024   Next office visit with prescribing clinician: Visit date not found     Additional details provided by patient: PATIENT IS OUT OF MEDICATION     Does the patient have less than a 3 day supply:  [x] Yes  [] No    Would you like a call back once the refill request has been completed: [] Yes [x] No    If the office needs to give you a call back, can they leave a voicemail: [] Yes [x] No    Amber Park MA   06/25/24 13:39 EDT

## 2024-06-26 RX ORDER — GABAPENTIN 300 MG/1
300 CAPSULE ORAL 3 TIMES DAILY
Qty: 270 CAPSULE | Refills: 0 | OUTPATIENT
Start: 2024-06-26

## 2024-08-01 ENCOUNTER — OFFICE VISIT (OUTPATIENT)
Dept: FAMILY MEDICINE CLINIC | Facility: CLINIC | Age: 69
End: 2024-08-01
Payer: MEDICARE

## 2024-08-01 VITALS
HEART RATE: 72 BPM | WEIGHT: 296 LBS | BODY MASS INDEX: 41.44 KG/M2 | HEIGHT: 71 IN | OXYGEN SATURATION: 95 % | SYSTOLIC BLOOD PRESSURE: 106 MMHG | DIASTOLIC BLOOD PRESSURE: 74 MMHG

## 2024-08-01 DIAGNOSIS — B35.4 TINEA CORPORIS: ICD-10-CM

## 2024-08-01 DIAGNOSIS — E56.9 VITAMIN DEFICIENCY: ICD-10-CM

## 2024-08-01 DIAGNOSIS — J42 CHRONIC BRONCHITIS, UNSPECIFIED CHRONIC BRONCHITIS TYPE: ICD-10-CM

## 2024-08-01 DIAGNOSIS — F51.01 PRIMARY INSOMNIA: ICD-10-CM

## 2024-08-01 DIAGNOSIS — F33.0 MILD RECURRENT MAJOR DEPRESSION: ICD-10-CM

## 2024-08-01 DIAGNOSIS — I10 BENIGN HYPERTENSION: Primary | ICD-10-CM

## 2024-08-01 DIAGNOSIS — M25.561 ARTHRALGIA OF BOTH KNEES: ICD-10-CM

## 2024-08-01 DIAGNOSIS — M25.562 ARTHRALGIA OF BOTH KNEES: ICD-10-CM

## 2024-08-01 DIAGNOSIS — Z79.899 HIGH RISK MEDICATION USE: ICD-10-CM

## 2024-08-01 DIAGNOSIS — Z12.5 ENCOUNTER FOR SCREENING FOR MALIGNANT NEOPLASM OF PROSTATE: ICD-10-CM

## 2024-08-01 DIAGNOSIS — R06.09 DYSPNEA ON EXERTION: ICD-10-CM

## 2024-08-01 DIAGNOSIS — R73.09 OTHER ABNORMAL GLUCOSE: ICD-10-CM

## 2024-08-01 DIAGNOSIS — G62.9 NEUROPATHY: ICD-10-CM

## 2024-08-01 LAB

## 2024-08-01 PROCEDURE — 3074F SYST BP LT 130 MM HG: CPT | Performed by: PHYSICIAN ASSISTANT

## 2024-08-01 PROCEDURE — 1126F AMNT PAIN NOTED NONE PRSNT: CPT | Performed by: PHYSICIAN ASSISTANT

## 2024-08-01 PROCEDURE — 99214 OFFICE O/P EST MOD 30 MIN: CPT | Performed by: PHYSICIAN ASSISTANT

## 2024-08-01 PROCEDURE — 3078F DIAST BP <80 MM HG: CPT | Performed by: PHYSICIAN ASSISTANT

## 2024-08-01 PROCEDURE — G2211 COMPLEX E/M VISIT ADD ON: HCPCS | Performed by: PHYSICIAN ASSISTANT

## 2024-08-01 RX ORDER — CLOTRIMAZOLE AND BETAMETHASONE DIPROPIONATE 10; .64 MG/G; MG/G
1 CREAM TOPICAL 2 TIMES DAILY
Qty: 45 G | Refills: 1 | Status: SHIPPED | OUTPATIENT
Start: 2024-08-01

## 2024-08-01 RX ORDER — BUSPIRONE HYDROCHLORIDE 30 MG/1
30 TABLET ORAL 2 TIMES DAILY
Qty: 60 TABLET | Refills: 0 | Status: SHIPPED | OUTPATIENT
Start: 2024-08-01

## 2024-08-01 NOTE — PROGRESS NOTES
Chief Complaint  Establish Care (Transfer of care from crystal)    Subjective          Fredy Palacios presents to [facility] for   History of Present Illness  .  History of Present Illness  The patient presents for evaluation of multiple medical concerns.    The patient has a history of recurrent yeast infections, which were previously managed with antibiotics. Currently, he is experiencing a recurrence of the yeast infection, characterized by a sensation akin to burning on the roof of his mouth, accompanied by ocular disturbances. The infection was particularly severe on his buttocks, genitals, and penis, causing significant bleeding. The rash on his buttocks persists, causing dead skin. He has not applied any lotions or creams. A recent consultation with a urologist resulted in a prescription for an antibiotic.    The patient reports a perceived onset of dementia, characterized by forgetfulness and episodes of aggression. He has been prescribed buspirone, which he takes twice daily. He is being followed by Behavioral health    The patient reports widespread pain, which he attributes to arthritis. He was previously advised to take 3 gabapentin daily, but has only been taking 2 due to uncertainty about the need for another dose. He was prescribed tramadol, but found it ineffective in managing his sleep or his pain. He states that he has taken trazodone for sleep which seems to help.  He also reports tingling in his feet and hands.He states that he has been diangosed with neuropathy in the past but does not have diabetes    The patient reports dyspnea on exertion, particularly when ascending stairs, which necessitates rest before at the top of the stairs He does not have shortness of breath walking around the house or other daily activities that do not require him to climb an incline. He has a history of panic attacks and is currently on a diuretic, which appears to be effective. He  "was advised to take an extra dose as needed. He denies any lower extremity edema. He was previously diagnosed with pericardial effusion and underwent cardiac catheterization in the 1990s or 2000s.      Objective   Vital Signs:   Vitals:    08/01/24 1313   BP: 106/74   Pulse: 72   SpO2: 95%   Weight: 134 kg (296 lb)   Height: 180.3 cm (70.98\")     Body mass index is 41.31 kg/m².  Class 3 Severe Obesity (BMI >=40). Obesity-related health conditions include the following: osteoarthritis. Obesity is unchanged. BMI is is above average; BMI management plan is completed. We discussed Information on healthy weight added to patient's after visit summary.      Family History   Problem Relation Age of Onset   • Cancer Mother    • Alcohol abuse Father    • Coronary artery disease Father    • Anxiety disorder Son         hhj       Social History     Tobacco Use   • Smoking status: Former     Average packs/day: 1 pack/day for 34.0 years (34.0 ttl pk-yrs)     Types: Cigars, Cigarettes     Start date: 1/1/1971     Passive exposure: Never   • Smokeless tobacco: Former     Types: Chew     Quit date: 1/11/2023   Substance Use Topics   • Alcohol use: Never       History reviewed. No pertinent surgical history.    Review of Systems      Physical Exam  Vitals and nursing note reviewed.   Constitutional:       General: He is not in acute distress.     Appearance: Normal appearance. He is obese. He is not ill-appearing, toxic-appearing or diaphoretic.   HENT:      Head: Normocephalic and atraumatic.      Right Ear: Tympanic membrane, ear canal and external ear normal.      Left Ear: Tympanic membrane, ear canal and external ear normal.      Nose: Nose normal.      Mouth/Throat:      Mouth: Mucous membranes are moist.   Eyes:      Pupils: Pupils are equal, round, and reactive to light.   Cardiovascular:      Rate and Rhythm: Normal rate and regular rhythm.      Heart sounds: Normal heart sounds.   Pulmonary:      Effort: Pulmonary effort " is normal.      Breath sounds: Normal breath sounds.   Abdominal:      General: Abdomen is flat.   Skin:     General: Skin is warm.   Neurological:      General: No focal deficit present.      Mental Status: He is alert.   Psychiatric:         Mood and Affect: Mood normal.      Result Review :                Immunization History   Administered Date(s) Administered   • COVID-19 (SHAMA) 03/09/2021   • COVID-19 (MODERNA) 1st,2nd,3rd Dose Monovalent 12/01/2021   • COVID-19 F23 (PFIZER) 12YRS+ (COMIRNATY) 12/01/2023   • Flu Vaccine Quad PF >36MO 12/01/2021   • Fluad Quad 65+ 12/01/2023   • Influenza, Unspecified 09/12/2020   • Pneumococcal Polysaccharide (PPSV23) 10/23/2017   • Tdap 11/30/2012   • Zostavax 09/19/2017       Health Maintenance   Topic Date Due   • ZOSTER VACCINE (2 of 3) 11/14/2017   • Pneumococcal Vaccine 65+ (2 of 2 - PCV) 10/23/2018   • HEPATITIS C SCREENING  Never done   • TDAP/TD VACCINES (2 - Td or Tdap) 11/30/2022   • COVID-19 Vaccine (4 - 2023-24 season) 04/01/2024   • INFLUENZA VACCINE  08/01/2024   • ANNUAL WELLNESS VISIT  01/03/2025   • LIPID PANEL  08/01/2025   • BMI FOLLOWUP  08/01/2025   • COLORECTAL CANCER SCREENING  05/03/2026   • AAA SCREEN (ONE-TIME)  Completed     .  Assessment & Plan               Assessment and Plan    Diagnoses and all orders for this visit:    1. Benign hypertension (Primary)  -     CBC Auto Differential  -     Comprehensive Metabolic Panel  -     Hemoglobin A1c  -     Lipid Panel  -     TSH  -     T4, free  -     PSA Screen    2. Chronic bronchitis, unspecified chronic bronchitis type  -     CBC Auto Differential  -     Comprehensive Metabolic Panel  -     Hemoglobin A1c  -     Lipid Panel    Consider cxr and cardio referral  3. Dyspnea on exertion  -     CBC Auto Differential  -     Comprehensive Metabolic Panel  -     Hemoglobin A1c  -     Lipid Panel  -    4. Arthralgia of both knees  -     CBC Auto Differential  -     Comprehensive Metabolic Panel  -      Hemoglobin A1c      5. Vitamin deficiency  -     Vitamin B12  -     Vitamin D,25-Hydroxy    6. Body mass index (BMI) 40.0-44.9, adult  -     Vitamin D,25-Hydroxy    7. Encounter for screening for malignant neoplasm of prostate  -     PSA Screen    8. Other abnormal glucose  -     Hemoglobin A1c    9. High risk medication use  -     POC Urine Drug Screen [facility]-Cup    10. Tinea corporis  -     clotrimazole-betamethasone (LOTRISONE) 1-0.05 % cream; Apply 1 Application topically to the appropriate area as directed 2 (Two) Times a Day.  Dispense: 45 g; Refill: 1  -     nystatin (MYCOSTATIN) 100,000 unit/mL suspension; 1ml in each cheek swish and swallow 4 times a day for 2 weeks or until clear  Dispense: 112 mL; Refill: 0    11. Neuropathy  Continue gabepentin at current dose- need negative UDT, signed substance agreement and Sha  12. Body mass index (BMI) of 40.0 to 44.9 in adult    13. Mild recurrent major depression  -     busPIRone (BUSPAR) 30 MG tablet; Take 1 tablet by mouth 2 (Two) Times a Day.  Dispense: 60 tablet; Refill: 0  14. Primary insomnia  Continue trazodone with negative UDT and signed CSA        Counseling/anticipatory guidance: Nutrition, physical activity, healthy weight, dental health, mental health, eye exam, immunizations, screenings      Follow Up   No follow-ups on file.  Patient was given instructions and counseling regarding his condition or for health maintenance advice. Please see specific information pulled into the AVS if appropriate.

## 2024-08-02 LAB
25(OH)D3+25(OH)D2 SERPL-MCNC: 43.4 NG/ML (ref 30–100)
ALBUMIN SERPL-MCNC: 4.2 G/DL (ref 3.9–4.9)
ALP SERPL-CCNC: 102 IU/L (ref 44–121)
ALT SERPL-CCNC: 21 IU/L (ref 0–44)
AST SERPL-CCNC: 27 IU/L (ref 0–40)
BASOPHILS # BLD AUTO: 0 X10E3/UL (ref 0–0.2)
BASOPHILS NFR BLD AUTO: 0 %
BILIRUB SERPL-MCNC: 0.6 MG/DL (ref 0–1.2)
BUN SERPL-MCNC: 15 MG/DL (ref 8–27)
BUN/CREAT SERPL: 12 (ref 10–24)
CALCIUM SERPL-MCNC: 9.6 MG/DL (ref 8.6–10.2)
CHLORIDE SERPL-SCNC: 99 MMOL/L (ref 96–106)
CHOLEST SERPL-MCNC: 189 MG/DL (ref 100–199)
CO2 SERPL-SCNC: 23 MMOL/L (ref 20–29)
CREAT SERPL-MCNC: 1.3 MG/DL (ref 0.76–1.27)
EGFRCR SERPLBLD CKD-EPI 2021: 59 ML/MIN/1.73
EOSINOPHIL # BLD AUTO: 0.2 X10E3/UL (ref 0–0.4)
EOSINOPHIL NFR BLD AUTO: 2 %
ERYTHROCYTE [DISTWIDTH] IN BLOOD BY AUTOMATED COUNT: 12.8 % (ref 11.6–15.4)
GLOBULIN SER CALC-MCNC: 2.2 G/DL (ref 1.5–4.5)
GLUCOSE SERPL-MCNC: 143 MG/DL (ref 70–99)
HBA1C MFR BLD: 5.7 % (ref 4.8–5.6)
HCT VFR BLD AUTO: 47.7 % (ref 37.5–51)
HDLC SERPL-MCNC: 63 MG/DL
HGB BLD-MCNC: 16.3 G/DL (ref 13–17.7)
IMM GRANULOCYTES # BLD AUTO: 0.1 X10E3/UL (ref 0–0.1)
IMM GRANULOCYTES NFR BLD AUTO: 1 %
LDLC SERPL CALC-MCNC: 105 MG/DL (ref 0–99)
LYMPHOCYTES # BLD AUTO: 2.5 X10E3/UL (ref 0.7–3.1)
LYMPHOCYTES NFR BLD AUTO: 25 %
MCH RBC QN AUTO: 32 PG (ref 26.6–33)
MCHC RBC AUTO-ENTMCNC: 34.2 G/DL (ref 31.5–35.7)
MCV RBC AUTO: 94 FL (ref 79–97)
MONOCYTES # BLD AUTO: 0.6 X10E3/UL (ref 0.1–0.9)
MONOCYTES NFR BLD AUTO: 6 %
NEUTROPHILS # BLD AUTO: 6.8 X10E3/UL (ref 1.4–7)
NEUTROPHILS NFR BLD AUTO: 66 %
PLATELET # BLD AUTO: 204 X10E3/UL (ref 150–450)
POTASSIUM SERPL-SCNC: 3.9 MMOL/L (ref 3.5–5.2)
PROT SERPL-MCNC: 6.4 G/DL (ref 6–8.5)
PSA SERPL-MCNC: 5.2 NG/ML (ref 0–4)
RBC # BLD AUTO: 5.1 X10E6/UL (ref 4.14–5.8)
SODIUM SERPL-SCNC: 139 MMOL/L (ref 134–144)
T4 FREE SERPL-MCNC: 1.38 NG/DL (ref 0.82–1.77)
TRIGL SERPL-MCNC: 118 MG/DL (ref 0–149)
TSH SERPL DL<=0.005 MIU/L-ACNC: 2.59 UIU/ML (ref 0.45–4.5)
VIT B12 SERPL-MCNC: 658 PG/ML (ref 232–1245)
VLDLC SERPL CALC-MCNC: 21 MG/DL (ref 5–40)
WBC # BLD AUTO: 10.3 X10E3/UL (ref 3.4–10.8)

## 2024-08-03 NOTE — PROGRESS NOTES
These call the patient and let him know that his kidney function and electrolytes as well as his liver function are normal.  His hemoglobin A1c is at 5.7 which is just at the level of prediabetes and has not changed.  His lipid panel shows a total cholesterol of 189 which is great as well as an LDL cholesterol is slightly elevated at 105.  His PSA is at 5.2 which is the same as it was a year ago and is also normal.  His his thyroid function is normal.  His B12 and vitamin D levels are also normal.  His blood count is also normal.

## 2024-08-19 ENCOUNTER — TELEMEDICINE (OUTPATIENT)
Dept: FAMILY MEDICINE CLINIC | Facility: CLINIC | Age: 69
End: 2024-08-19
Payer: MEDICARE

## 2024-08-19 DIAGNOSIS — R29.6 FALLS FREQUENTLY: ICD-10-CM

## 2024-08-19 DIAGNOSIS — M25.552 BILATERAL HIP PAIN: ICD-10-CM

## 2024-08-19 DIAGNOSIS — M25.551 BILATERAL HIP PAIN: ICD-10-CM

## 2024-08-19 DIAGNOSIS — M25.562 ACUTE PAIN OF LEFT KNEE: Primary | ICD-10-CM

## 2024-08-19 DIAGNOSIS — E66.01 MORBID OBESITY: ICD-10-CM

## 2024-08-19 PROCEDURE — 1126F AMNT PAIN NOTED NONE PRSNT: CPT | Performed by: PHYSICIAN ASSISTANT

## 2024-08-19 PROCEDURE — 99213 OFFICE O/P EST LOW 20 MIN: CPT | Performed by: PHYSICIAN ASSISTANT

## 2024-08-19 NOTE — PROGRESS NOTES
.    Mode of Visit: Video  Location of patient: home  You have chosen to receive care through a telehealth visit.  The patient has signed the video visit consent form.  The visit included audio and video interaction. No technical issues occurred during this visit.     Discussed limitations to virtual visit and patient was agreeable to continue. Discussed that because of the limitations of minimal physical exam that if there is interval worsening they should present to the office for either curbside visit, or to the emergency department for further evaluation and definitive management. Patient was agreeable to this.    Mode of Visit: Video ([employer])  Location of patient: home  Location of provider: [facility]  You have chosen to receive care through a telehealth visit.  Does the patient consent to use a video/audio connection for your medical care today? Yes  The visit included audio and video interaction. No technical issues occurred during this             Chief Complaint  Fall (Fell getting out of shower yesterday. ) and Anxiety (Wants to discuss dose of medication)      Fredy Palacios presents to [facility]  Patient states that he has fallen three times since moving into new apartment with wife and daughter (3 months ago). Yesterday he fell while getting out of the shower and had to call EMS to get him up off the floor. He states that his left knee was bent when he fell and it is still hurting today. He states that it is quite sore and may be a bit swollen. He has been using an ice therapy wrap as well as taking some ibuprofen and tylenol without much relief. He states that he has not been putting much weight on it and has been using a cane. He does not feel as steady on his feet as he used to .     He states that he has been on his increase of his anxiety medication and has not noticed a big difference just yet and wonders if it will take more time or something else should be  tried        Review of Systems  DEANDRE-7      Over the last two weeks, how often have you been bothered by the following problems?  Feeling nervous, anxious or on edge: Nearly every day  Not being able to stop or control worrying: Nearly every day  Worrying too much about different things: Nearly every day  Trouble Relaxing: Nearly every day  Being so restless that it is hard to sit still: Nearly every day  Becoming easily annoyed or irritable: Nearly every day  Feeling afraid as if something awful might happen: Not at all  DEANDRE 7 Total Score: 18  If you checked any problems, how difficult have these problems made it for you to do your work, take care of things at home, or get along with other people: Extremely difficult    Objective   Vital Signs:   There were no vitals taken for this visit.    Physical Exam   Constitutional: He appears well-developed and well-nourished. No distress.   Pulmonary/Chest: Effort normal.   Skin: He is not diaphoretic.   Psychiatric: He has a normal mood and affect.                    Assessment and Plan    Diagnoses and all orders for this visit:    1. Acute pain of left knee (Primary)  -     Ambulatory Referral to Physical Therapy for Evaluation & Treatment    2. Morbid obesity  -     Ambulatory Referral to Physical Therapy for Evaluation & Treatment    3. Bilateral hip pain  -     Ambulatory Referral to Physical Therapy for Evaluation & Treatment    4. Falls frequently  -     Ambulatory Referral to Physical Therapy for Evaluation & Treatment    Discussed with the patient would like for him to continue ice therapy several times a day and to keep knee in a wrap otherwise. If sx are worsening to go to ED for further eval and xrays. Will send him to PT for gait training and help with balance as well   Discussed that his anxiety medication can take a few weeks to have his symptoms improve.         Follow Up   No follow-ups on file.  Patient was given instructions and counseling regarding his  condition or for health maintenance advice. Please see specific information pulled into the AVS if appropriate.

## 2024-08-26 DIAGNOSIS — K21.9 GASTROESOPHAGEAL REFLUX DISEASE WITHOUT ESOPHAGITIS: ICD-10-CM

## 2024-08-27 RX ORDER — OMEPRAZOLE 40 MG/1
40 CAPSULE, DELAYED RELEASE ORAL DAILY
Qty: 90 CAPSULE | Refills: 0 | Status: SHIPPED | OUTPATIENT
Start: 2024-08-27

## 2024-08-28 DIAGNOSIS — F33.0 MILD RECURRENT MAJOR DEPRESSION: ICD-10-CM

## 2024-08-28 RX ORDER — BUSPIRONE HYDROCHLORIDE 30 MG/1
30 TABLET ORAL 2 TIMES DAILY
Qty: 60 TABLET | Refills: 0 | Status: SHIPPED | OUTPATIENT
Start: 2024-08-28

## 2024-09-03 ENCOUNTER — TELEMEDICINE (OUTPATIENT)
Dept: FAMILY MEDICINE CLINIC | Facility: CLINIC | Age: 69
End: 2024-09-03
Payer: MEDICARE

## 2024-09-03 DIAGNOSIS — M25.551 BILATERAL HIP PAIN: ICD-10-CM

## 2024-09-03 DIAGNOSIS — R41.3 MEMORY DEFICITS: ICD-10-CM

## 2024-09-03 DIAGNOSIS — M25.552 BILATERAL HIP PAIN: ICD-10-CM

## 2024-09-03 DIAGNOSIS — F33.0 MILD RECURRENT MAJOR DEPRESSION: Primary | ICD-10-CM

## 2024-09-03 DIAGNOSIS — M25.561 ARTHRALGIA OF BOTH KNEES: ICD-10-CM

## 2024-09-03 DIAGNOSIS — H10.13 ALLERGIC CONJUNCTIVITIS OF BOTH EYES: ICD-10-CM

## 2024-09-03 DIAGNOSIS — M25.562 ARTHRALGIA OF BOTH KNEES: ICD-10-CM

## 2024-09-03 DIAGNOSIS — R41.3 MEMORY CHANGES: ICD-10-CM

## 2024-09-03 PROCEDURE — 99213 OFFICE O/P EST LOW 20 MIN: CPT | Performed by: PHYSICIAN ASSISTANT

## 2024-09-03 PROCEDURE — 1126F AMNT PAIN NOTED NONE PRSNT: CPT | Performed by: PHYSICIAN ASSISTANT

## 2024-09-03 RX ORDER — KETOTIFEN FUMARATE 0.35 MG/ML
1 SOLUTION/ DROPS OPHTHALMIC 2 TIMES DAILY
Qty: 10 ML | Refills: 0 | Status: SHIPPED | OUTPATIENT
Start: 2024-09-03

## 2024-09-03 NOTE — PROGRESS NOTES
.    Mode of Visit: Video  Location of patient: home  You have chosen to receive care through a telehealth visit.  The patient has signed the video visit consent form.  The visit included audio and video interaction. No technical issues occurred during this visit.     Discussed limitations to virtual visit and patient was agreeable to continue. Discussed that because of the limitations of minimal physical exam that if there is interval worsening they should present to the office for either curbside visit, or to the emergency department for further evaluation and definitive management. Patient was agreeable to this.    Mode of Visit: Video ([employer])  Location of patient: home  Location of provider: [facility]  You have chosen to receive care through a telehealth visit.  Does the patient consent to use a video/audio connection for your medical care today? Yes  The visit included audio and video interaction. No technical issues occurred during this             Chief Complaint  Arthritis, Eye Drainage, and Dementia (Wants to discuss possible dementia)      Fredy Palacios presents to [facility]  Patient states that he continues to have issues with his memory.  He had been previously referred to a neurologist but states that he did not follow-up or go to the appointment.  Would like another referral to neurology.    Patient states has had no further pain in his mouth since using rinse.  He has noticed that for the past couple of days his eyes have been little goopy.  He has had some crusting in the inside of his eyes upon waking.  He states that he has had an increase in allergy symptoms.  He is currently taking no allergy medications or eyedrops.    He also states that he hurts all over he notices that his pain in his joints is worse in his legs as well as in both arms and shoulders.  He wonders if there is more he can do for the pain.    He states that he is currently taking his  BuSpar and feels that has helped his depression but notes that he still feeling a bit staffers against his wife.  He states that he has done this in the past and went to psychiatry for help with this.  He has not as enthusiastic about going to psychiatry at this time.    Review of Systems    Objective   Vital Signs:   There were no vitals taken for this visit.    Physical Exam   Constitutional: He appears well-developed and well-nourished.   HENT:   Head: Normocephalic and atraumatic.   Pulmonary/Chest: Effort normal.   Psychiatric: He has a normal mood and affect.          Assessment and Plan    Diagnoses and all orders for this visit:    1. Mild recurrent major depression (Primary)  -     Ambulatory Referral to Behavioral Health  Discussed with patient that with his SNF patient is would like for him to get back in with counseling to work on this.  He may certainly do virtual visits for that if he would like his that would have to get up and go to the appointments.  2. Arthralgia of both knees  -     Ambulatory Referral to Orthopedic Surgery  He is also taking as much medicine as I can give him for his arthritis currently and referred to an arthritis specialist  3. Bilateral hip pain  -     Ambulatory Referral to Orthopedic Surgery    4. Memory changes  -     Ambulatory Referral to Neurology  Discussed with patient that he needs to see a neurologist for further evaluation of his memory changes and memory deficits.  5. Memory deficits  -     Ambulatory Referral to Neurology    6. Allergic conjunctivitis of both eyes  Have given him eyedrops to use as needed for his watery eyes.  Other orders  -     Ketotifen Fumarate (ZADITOR) 0.035 % solution; Administer 1 drop to both eyes 2 (Two) Times a Day.  Dispense: 10 mL; Refill: 0            Follow Up   No follow-ups on file.  Patient was given instructions and counseling regarding his condition or for health maintenance advice. Please see specific information pulled into  the AVS if appropriate.

## 2024-09-27 DIAGNOSIS — M54.50 LUMBAR PAIN: ICD-10-CM

## 2024-09-27 RX ORDER — GABAPENTIN 300 MG/1
300 CAPSULE ORAL 2 TIMES DAILY
Qty: 28 CAPSULE | Refills: 0 | Status: SHIPPED | OUTPATIENT
Start: 2024-09-27

## 2024-09-27 NOTE — TELEPHONE ENCOUNTER
Patient had inappropriate UDS. Discussed with PCP, and patient will be weaned off gabapentin. Will send in 300 mg BID for 2 weeks then 100mg BID for 2 weeks then 100mg QHS for 2 weeks then off. No further controlled refills including Gabapentin and Tramadol.

## 2024-09-27 NOTE — TELEPHONE ENCOUNTER
HUB TO RELAY: LVM for pt to call back.   Would you mind calling patient and seeing about his not filling the med for over a month? Part of the controlled agreement is taking the medication as prescribed

## 2024-09-27 NOTE — TELEPHONE ENCOUNTER
Name: Fredy Palacios      Relationship: Self      Best Callback Number: [phone]       HUB PROVIDED THE RELAY MESSAGE FROM THE OFFICE      PATIENT: VOICED UNDERSTANDING AND HAS NO FURTHER QUESTIONS AT THIS TIME    ADDITIONAL INFORMATION: PATIENT ADVISES THAT HE IS TAKING THE MEDICATION AS PRESCRIBED.

## 2024-09-27 NOTE — TELEPHONE ENCOUNTER
Would you mind calling patient and seeing about his not filling the med for over a month? Part of the controlled agreement is taking the medication as prescribed

## 2024-09-27 NOTE — TELEPHONE ENCOUNTER
Caller: Fredy Palacios    Relationship: Self    Best call back number: [phone]     Requested Prescriptions:   Requested Prescriptions     Pending Prescriptions Disp Refills    gabapentin (NEURONTIN) 300 MG capsule 270 capsule 0     Sig: Take 1 capsule by mouth 3 (Three) Times a Day.        Pharmacy where request should be sent: [facility] [phone] [facility] [phone]      Last office visit with prescribing clinician: 8/1/2024   Last telemedicine visit with prescribing clinician: 9/3/2024   Next office visit with prescribing clinician: Visit date not found       Does the patient have less than a 3 day supply:  [x] Yes  [] No    Would you like a call back once the refill request has been completed: [] Yes [x] No    If the office needs to give you a call back, can they leave a voicemail: [] Yes [x] No    Kyle Mckeon Rep   09/27/24 11:12 EDT

## 2024-09-27 NOTE — TELEPHONE ENCOUNTER
Incoming Refill Request      Medication requested (name and dose): GABAPENTIN    Pharmacy where request should be sent: [facility]    Additional details provided by patient: PATIENT HAS MOVED BACK TO [facility] AND RECENTLY SWITCHED TO [facility].    Best call back number: [phone]    Does the patient have less than a 3 day supply:  [x] Yes  [] No    Kyle Morejon Rep  09/27/24, 13:30 EDT

## 2024-10-01 DIAGNOSIS — F33.0 MILD RECURRENT MAJOR DEPRESSION: ICD-10-CM

## 2024-10-01 RX ORDER — BUSPIRONE HYDROCHLORIDE 30 MG/1
30 TABLET ORAL 2 TIMES DAILY
Qty: 60 TABLET | Refills: 0 | Status: SHIPPED | OUTPATIENT
Start: 2024-10-01

## 2024-10-02 ENCOUNTER — TELEPHONE (OUTPATIENT)
Dept: FAMILY MEDICINE CLINIC | Facility: CLINIC | Age: 69
End: 2024-10-02
Payer: MEDICARE

## 2024-10-02 NOTE — TELEPHONE ENCOUNTER
Kacy was going to call patient about this and I dont think she got ahold of him- He failed his drug screen and we can begin to wean from meds as we will no longer prescribe. He cannot quite altogether hence the wean., Happy to refer him to Pain management Will continue to prescribe medication for other issues.

## 2024-10-02 NOTE — TELEPHONE ENCOUNTER
Pt would like callback to discuss medication, he feels that it was incorrect when it was filled. Notes state they are decreasing the dose, but Pt said he did not get enough medication. Please call back to advise.

## 2024-10-02 NOTE — TELEPHONE ENCOUNTER
I have not call pt. I apologize I understood that someone else was going to reach out to him about drug screen and refill. Are you ok to contact?

## 2024-10-08 NOTE — TELEPHONE ENCOUNTER
"Spoke with patient. Relayed provider message. Pt voiced understanding. He said that he has been \"smoking pot the whole time\" and no one has ever said anything until now.  He said his wife is going to pain management and he will wait and see how she does with that before he decides if he wants to go.    He asked if he could see Crystal. I explained that she is seeing acute care patients on Mon, Wed, Fri and will not address any chronic care issues. Pt voiced understanding.    Pt asked how he will be weaned off of Gabapentin.    "

## 2024-10-09 ENCOUNTER — TELEPHONE (OUTPATIENT)
Dept: FAMILY MEDICINE CLINIC | Facility: CLINIC | Age: 69
End: 2024-10-09

## 2024-10-09 NOTE — TELEPHONE ENCOUNTER
Please call the patient and let him know that I am working with Dr torrez  to help wean him from his gabepentin. This will take placed over the course of several months slowly. His conditions that require pain management will be taken care of by pain management but his other conditions we will still take care of

## 2024-10-09 NOTE — TELEPHONE ENCOUNTER
Caller: Fredy Palacios    Relationship: Self    Best call back number: [phone]     Which medication are you concerned about: PCP    Who prescribed you this medication: gabapentin (NEURONTIN) 300 MG capsule     What are your concerns: PATIENT STATES HE WAS TOLD HE WILL BE WEANED OFF THE gabapentin (NEURONTIN) 300 MG capsule BUT WOULD LIKE TO ASK HOW THE WEANING PROCESS WILL WORK. PATIENT WOULD ALSO LIKE TO ASK HOW HIS OTHER HEALTH CONDITIONS HE IS TAKING THE MEDICATION FOR WILL BE ADDRESSED AS HE IS BEING WEANED OFF OF THE MEDICATION.

## 2024-10-14 ENCOUNTER — TELEPHONE (OUTPATIENT)
Dept: FAMILY MEDICINE CLINIC | Facility: CLINIC | Age: 69
End: 2024-10-14

## 2024-10-14 DIAGNOSIS — M25.561 ARTHRALGIA OF BOTH KNEES: ICD-10-CM

## 2024-10-14 DIAGNOSIS — M25.551 BILATERAL HIP PAIN: ICD-10-CM

## 2024-10-14 DIAGNOSIS — M25.552 BILATERAL HIP PAIN: ICD-10-CM

## 2024-10-14 DIAGNOSIS — M25.562 ARTHRALGIA OF BOTH KNEES: ICD-10-CM

## 2024-10-14 DIAGNOSIS — M54.50 LUMBAR PAIN: Primary | ICD-10-CM

## 2024-10-14 NOTE — TELEPHONE ENCOUNTER
"Relay     \"CALLED PATIENT TO LET HIM KNOW THAT IN ORDER FOR HIM TO RECEIVE MEDICATION FROM A PAIN MANANGMENT PROVIDER, HE WILL HAVE TO GO TO [place] AT [facility]. THERE ARE NO OFFICES IN [place] THAT PROVIDE MEDICATION FOR PAIN. ALL THE [place] AND [place] OFFICES ARE INTERVENTIONAL PAIN.\"                "

## 2024-10-28 DIAGNOSIS — M54.50 LUMBAR PAIN: ICD-10-CM

## 2024-10-29 RX ORDER — GABAPENTIN 100 MG/1
100 CAPSULE ORAL
Qty: 14 CAPSULE | Refills: 0 | Status: SHIPPED | OUTPATIENT
Start: 2024-10-29

## 2024-10-29 NOTE — TELEPHONE ENCOUNTER
Please call patient and remind him that this is his last fill of gabapentin form us and we are happy to send in a referral to pain management for him

## 2024-10-29 NOTE — TELEPHONE ENCOUNTER
Rx Refill Note  Requested Prescriptions     Pending Prescriptions Disp Refills    gabapentin (NEURONTIN) 300 MG capsule [Pharmacy Med Name: GABAPENTIN 300MG] 28 capsule 0     Sig: TAKE 1 CAPSULE BY MOUTH 2 TIMES A DAY      Last office visit with prescribing clinician: Visit date not found   Last telemedicine visit with prescribing clinician: 9/3/2024   Next office visit with prescribing clinician: Visit date not found                         Would you like a call back once the refill request has been completed: [] Yes [] No    If the office needs to give you a call back, can they leave a voicemail: [] Yes [] No    Sheila Baker MA  10/29/24, 08:26 EDT

## 2024-10-29 NOTE — TELEPHONE ENCOUNTER
Pt contacted, voiced understanding, reports that he does want to go to pain management - referral already in place.

## 2024-10-31 DIAGNOSIS — F33.0 MILD RECURRENT MAJOR DEPRESSION: ICD-10-CM

## 2024-10-31 RX ORDER — BUSPIRONE HYDROCHLORIDE 30 MG/1
30 TABLET ORAL 2 TIMES DAILY
Qty: 180 TABLET | Refills: 0 | Status: SHIPPED | OUTPATIENT
Start: 2024-10-31

## 2025-02-20 ENCOUNTER — TELEPHONE (OUTPATIENT)
Dept: FAMILY MEDICINE CLINIC | Facility: CLINIC | Age: 70
End: 2025-02-20
Payer: MEDICARE

## 2025-03-17 DIAGNOSIS — R60.0 PERIPHERAL EDEMA: ICD-10-CM

## 2025-03-17 RX ORDER — POTASSIUM CHLORIDE 1500 MG/1
20 TABLET, EXTENDED RELEASE ORAL DAILY
Qty: 90 TABLET | Refills: 0 | OUTPATIENT
Start: 2025-03-17

## 2025-04-04 NOTE — PROGRESS NOTES
Your potassium was slightly low and your kidney function was abnormal. Are you taking a potassium supplement daily?  You can access the FollowMyHealth Patient Portal offered by [facility] by registering at the following website: [email or web address]. By joining [facility]’s FollowMyHealth portal, you will also be able to view your health information using other applications (apps) compatible with our system.

## 2025-08-11 DIAGNOSIS — J30.1 ALLERGIC RHINITIS DUE TO POLLEN, UNSPECIFIED SEASONALITY: ICD-10-CM

## 2025-08-11 RX ORDER — MONTELUKAST SODIUM 10 MG/1
10 TABLET ORAL EVERY EVENING
Qty: 90 TABLET | Refills: 2 | OUTPATIENT
Start: 2025-08-11